# Patient Record
Sex: FEMALE | Employment: FULL TIME | URBAN - METROPOLITAN AREA
[De-identification: names, ages, dates, MRNs, and addresses within clinical notes are randomized per-mention and may not be internally consistent; named-entity substitution may affect disease eponyms.]

---

## 2018-04-30 ENCOUNTER — HOSPITAL ENCOUNTER (OUTPATIENT)
Dept: ONCOLOGY | Age: 53
Discharge: HOME | End: 2018-04-30

## 2018-04-30 ENCOUNTER — OFFICE VISIT (OUTPATIENT)
Dept: ONCOLOGY | Age: 53
End: 2018-04-30

## 2018-04-30 ENCOUNTER — HOSPITAL ENCOUNTER (OUTPATIENT)
Dept: LAB | Age: 53
Discharge: HOME | End: 2018-04-30
Payer: SELF-PAY

## 2018-04-30 VITALS
BODY MASS INDEX: 26.97 KG/M2 | WEIGHT: 171.8 LBS | TEMPERATURE: 98.2 F | SYSTOLIC BLOOD PRESSURE: 146 MMHG | HEART RATE: 73 BPM | DIASTOLIC BLOOD PRESSURE: 87 MMHG | HEIGHT: 67 IN

## 2018-04-30 DIAGNOSIS — D50.8 IRON DEFICIENCY ANEMIA SECONDARY TO INADEQUATE DIETARY IRON INTAKE: Primary | ICD-10-CM

## 2018-04-30 DIAGNOSIS — D50.8 IRON DEFICIENCY ANEMIA SECONDARY TO INADEQUATE DIETARY IRON INTAKE: ICD-10-CM

## 2018-04-30 LAB
ALBUMIN SERPL-MCNC: 3.5 G/DL (ref 3.4–5)
ALBUMIN/GLOB SERPL: 0.8 {RATIO} (ref 0.8–1.7)
ALP SERPL-CCNC: 87 U/L (ref 45–117)
ALT SERPL-CCNC: 16 U/L (ref 13–56)
ANION GAP SERPL CALC-SCNC: 12 MMOL/L (ref 3–18)
AST SERPL-CCNC: 15 U/L (ref 15–37)
BASO+EOS+MONOS # BLD AUTO: 0.5 K/UL (ref 0–2.3)
BASO+EOS+MONOS # BLD AUTO: 9 % (ref 0.1–17)
BILIRUB SERPL-MCNC: 0.3 MG/DL (ref 0.2–1)
BUN SERPL-MCNC: 11 MG/DL (ref 7–18)
BUN/CREAT SERPL: 15 (ref 12–20)
CALCIUM SERPL-MCNC: 9.1 MG/DL (ref 8.5–10.1)
CHLORIDE SERPL-SCNC: 103 MMOL/L (ref 100–108)
CO2 SERPL-SCNC: 22 MMOL/L (ref 21–32)
CREAT SERPL-MCNC: 0.74 MG/DL (ref 0.6–1.3)
DIFFERENTIAL METHOD BLD: ABNORMAL
ERYTHROCYTE [DISTWIDTH] IN BLOOD BY AUTOMATED COUNT: 17.1 % (ref 11.5–14.5)
FERRITIN SERPL-MCNC: 5 NG/ML (ref 8–388)
GLOBULIN SER CALC-MCNC: 4.2 G/DL (ref 2–4)
GLUCOSE SERPL-MCNC: 129 MG/DL (ref 74–99)
HCT VFR BLD AUTO: 27.4 % (ref 36–48)
HGB BLD-MCNC: 7.9 G/DL (ref 12–16)
IRON SATN MFR SERPL: 3 %
IRON SERPL-MCNC: 18 UG/DL (ref 50–175)
LYMPHOCYTES # BLD: 1.8 K/UL (ref 1.1–5.9)
LYMPHOCYTES NFR BLD: 28 % (ref 14–44)
MCH RBC QN AUTO: 19.6 PG (ref 25–35)
MCHC RBC AUTO-ENTMCNC: 28.8 G/DL (ref 31–37)
MCV RBC AUTO: 67.8 FL (ref 78–102)
NEUTS SEG # BLD: 4 K/UL (ref 1.8–9.5)
NEUTS SEG NFR BLD: 63 % (ref 40–70)
PLATELET # BLD AUTO: 488 K/UL (ref 140–440)
POTASSIUM SERPL-SCNC: 4 MMOL/L (ref 3.5–5.5)
PROT SERPL-MCNC: 7.7 G/DL (ref 6.4–8.2)
RBC # BLD AUTO: 4.04 M/UL (ref 4.1–5.1)
SODIUM SERPL-SCNC: 137 MMOL/L (ref 136–145)
TIBC SERPL-MCNC: 535 UG/DL (ref 250–450)
WBC # BLD AUTO: 6.3 K/UL (ref 4.5–13)

## 2018-04-30 PROCEDURE — 80053 COMPREHEN METABOLIC PANEL: CPT | Performed by: INTERNAL MEDICINE

## 2018-04-30 PROCEDURE — 36415 COLL VENOUS BLD VENIPUNCTURE: CPT | Performed by: INTERNAL MEDICINE

## 2018-04-30 PROCEDURE — 83540 ASSAY OF IRON: CPT | Performed by: INTERNAL MEDICINE

## 2018-04-30 PROCEDURE — 82728 ASSAY OF FERRITIN: CPT | Performed by: INTERNAL MEDICINE

## 2018-04-30 PROCEDURE — 84165 PROTEIN E-PHORESIS SERUM: CPT | Performed by: INTERNAL MEDICINE

## 2018-04-30 RX ORDER — CYCLOBENZAPRINE HCL 10 MG
TABLET ORAL
COMMUNITY
End: 2018-05-16

## 2018-04-30 RX ORDER — CLOPIDOGREL BISULFATE 75 MG/1
75 TABLET ORAL DAILY
COMMUNITY

## 2018-04-30 NOTE — PROGRESS NOTES
Hematology/Oncology Consultation Note    Name: Wayna Landau  Date: 2018  : 1965    PCP: Phong Houston MD       Ms. Horacio Browne  is a 52 y.o. -American woman who is referred for evaluation of anemia. Subjective:   Chief complaint: Low blood count    History of present illness:  Ms. Horacio Browne is a 47-year-old woman who had lab test done on 3/29/2018 which reveal a low hemoglobin level of 8.2 g/dL with hematocrit of 28.9%. A WBC count was normal at 6.6 and her platelets were slightly elevated at 491,000. The patient denies having any blood in her urine or stool. She states that she has not had problems in the past with anemia. Currently she is experiencing occasional fatigue and some weakness. She has not taken any over-the-counter iron supplements. She is here today for complete assessment and recommendations for management. Past Medical History:   Diagnosis Date    Anemia     Constipation     Diabetes ([facility]     Hypertension     Poor appetite     Stomach pain     [facility]        No Known Allergies    No past surgical history on file. Social History     Social History    Marital status:      Spouse name: N/A    Number of children: N/A    Years of education: N/A     Occupational History    Not on file.      Social History Main Topics    Smoking status: Current Every Day Smoker     Packs/day: 0.25    Smokeless tobacco: Never Used    Alcohol use Yes      Comment: occassionally    Drug use: No    Sexual activity: Not on file     Other Topics Concern    Not on file     Social History Narrative    No narrative on file       Family History   Problem Relation Age of Onset    Cancer Mother     Hypertension Mother     Diabetes Mother     Stroke Mother     Cancer Sister      cervical    Hypertension Sister     Cancer Brother      pancreatic    Hypertension Brother     Diabetes Brother     Cancer Sister      colon    Hypertension Sister        Current Outpatient Prescriptions   Medication Sig Dispense Refill    clopidogrel (PLAVIX) 75 mg tab Take  by mouth.  cyclobenzaprine (FLEXERIL) 10 mg tablet Take  by mouth three (3) times daily as needed for Muscle Spasm(s).  Diclofenac Potassium 50 mg pwpk Take  by mouth three (3) times daily. Review of Systems    General ROS:The patient has complaints of mild fatigue and weakness. Psychological ROS: patient denies having any psychological symptoms such as hallucinations, depression or anxiety. Ophthalmic ROS:the patient denies having any visual impairment or eye discomfort. ENT ROS: there are no abnormalities reported. Allergy and Immunology ROS:the patient denies having any seasonal allergies or allergies to medications other than those already outlined above. Hematological and Lymphatic ROS: the patient denies having any bruising, bleeding or lymphadenopathy. Endocrine ROS: the patient denies having any heat or cold intolerance. There is no history of diabetes or thyroid disorders. Breast ROS: the patient denies having any history of breast mass, nipple discharge, or lumps. Respiratory ROS:the patient denies having any cough, shortness of breath, or dyspnea on exertion. Cardiovascular ROS: there are no complaints of chest pain, palpitations, chest pounding, or dyspnea on exertion. Gastrointestinal ROS: the patient denies having nausea, emesis, diarrhea, constipation, or blood in the stool. Genito-Urinary ROS: the patient denies having urinary urgency, frequency, or dysuria. Musculoskeletal ROS: with the exception of mild arthralgias the patient has no other musculoskeletal complaints. Neurological ROS: the patient denies having any numbness, tingling, or neurologic deficits. Dermatological ROS:patient denies having any unexplained rash, skin ulcerations, or hives.       Objective:     Visit Vitals    /87    Pulse 73    Temp 98.2 °F (36.8 °C) (Oral)    Ht 5' 7\" (1.702 m)    Wt 77.9 kg (171 lb 12.8 oz)    BMI 26.91 kg/m2        Physical Exam:   Gen. Appearance: the patient is in no acute distress. Skin: There is no evidence of bruise or rash. HEENT: The head is normocephalic and atraumatic. The conjunctiva and sclera are clear. Pupils are equal, round, reactive to light, and accommodation. The extraocular movements are intact. ENT reveals no oral mucosal lesions or ulcerations. Neck: Supple without lymphadenopathy or thyromegaly. Lungs: Clear to auscultation and percussion; there are no wheezes or rhonchi. Heart: Regular rate and rhythm; there are no murmurs, gallops, or rubs. Abdomen: Bowel sounds are present and normal.  There is no guarding, tenderness, or hepatosplenomegaly. Extremities: There is no clubbing, cyanosis, or edema. Neurologic: There are no focal neurologic deficits. Lymphatics: There is no palpable peripheral lymphadenopathy. Lab data: The CBC from 3/29/2018 shows WBC count of 6.6, hemoglobin 8.2 g/dL, hematocrit 28.9%, and platelet count was 163,704. Assessment:   Chronic anemia: I suspect we are dealing with iron deficiency anemia however plasma cell dyscrasia is also a diagnostic consideration as well. Thrombocytosis: I suspect that this is reactive thrombocytosis due to her underlying anemia. Plan:   Chronic anemia: At this time I will evaluate for the possibility that we are dealing with iron deficiency anemia versus a plasma cell dyscrasia. A comprehensive metabolic panel, iron profile, and ferritin level will be obtained. Additionally, an SPEP will also be requested. Thrombocytosis: Pending the results of the tests for iron deficiency the patient may need to undergo a bone marrow biopsy if there is no response to therapy for iron deficiency.     Follow-up in 2 weeks to review lab data    Orders Placed This Encounter    COMPLETE CBC & AUTO DIFF WBC    METABOLIC PANEL, COMPREHENSIVE     Standing Status:   Future     Standing Expiration Date: 5/1/2019    IRON PROFILE     Standing Status:   Future     Standing Expiration Date:   5/1/2019    SPEP     Standing Status:   Future     Standing Expiration Date:   5/1/2019    FERRITIN     Standing Status:   Future     Standing Expiration Date:   5/1/2019    InHouse CBC ([employer])     Standing Status:   Future     Number of Occurrences:   1     Standing Expiration Date:   5/7/2018    clopidogrel (PLAVIX) 75 mg tab     Sig: Take  by mouth.  cyclobenzaprine (FLEXERIL) 10 mg tablet     Sig: Take  by mouth three (3) times daily as needed for Muscle Spasm(s).  Diclofenac Potassium 50 mg pwpk     Sig: Take  by mouth three (3) times daily. Ирина Reeves MD  4/30/2018      Please note: This document has been produced using voice recognition software. Unrecognized errors in transcription may be present.

## 2018-04-30 NOTE — MR AVS SNAPSHOT
[address] [address] [address] [facility] 
[phone] Patient: Freedom Mishra MRN: [id number] QCQ:3/2/9029 Visit Information Date & Time Provider Department Dept. Phone Encounter #  
 4/30/2018 10:45 AM Audrey Mullins MD [facility] Medical Oncology [phone] [id number] Follow-up Instructions Return in about 2 weeks (around 5/14/2018). Your Appointments 5/1/2018 11:00 AM  
New Patient with MD LUIS ENRIQUE [facility] ([facility]) Appt Note: Ref from Dr Virgilio Jensen [facility] [id number] [address] [place] [address] 5/14/2018  9:30 AM  
Office Visit with Audrey Mullins MD  
[facility]  Oncology [facility]) Appt Note: 2 week results [address], [address] [place] [address] [address] [facility] Upcoming Health Maintenance Date Due Hepatitis C Screening 1965 DTaP/Tdap/Td series (1 - Tdap) 3/1/1984 PAP AKA CERVICAL CYTOLOGY 3/1/1984 BREAST CANCER SCRN MAMMOGRAM 3/1/2013 FOBT Q 1 YEAR AGE 50-75 3/1/2013 Influenza Age 5 to Adult 8/1/2018 Allergies as of 4/30/2018  Review Complete On: 4/30/2018 By: Audrey Mullins MD  
 No Known Allergies Current Immunizations  Never Reviewed No immunizations on file. Not reviewed this visit You Were Diagnosed With   
  
 Codes Comments Iron deficiency anemia secondary to inadequate dietary iron intake    -  Primary ICD-10-CM: D50.8 ICD-9-CM: 280.1 Vitals BP Pulse Temp Height(growth percentile) Weight(growth percentile) BMI  
 146/87 73 98.2 °F (36.8 °C) (Oral) 5' 7\" (1.702 m) 171 lb 12.8 oz (77.9 kg) 26.91 kg/m2 Smoking Status Current Every Day Smoker BMI and BSA Data Body Mass Index Body Surface Area  
 26.91 kg/m 2 1.92 m 2 Your Updated Medication List  
  
   
This list is accurate as of 4/30/18 12:14 PM.  Always use your most recent med list.  
  
  
  
  
 cyclobenzaprine 10 mg tablet Commonly known as:  FLEXERIL Take  by mouth three (3) times daily as needed for Muscle Spasm(s). Diclofenac Potassium 50 mg Pwpk Take  by mouth three (3) times daily. PLAVIX 75 mg Tab Generic drug:  clopidogrel Take  by mouth. We Performed the Following COMPLETE CBC & AUTO DIFF WBC [53856 CPT(R)] Follow-up Instructions Return in about 2 weeks (around 5/14/2018). To-Do List   
 04/30/2018 Lab:  CBC WITH 3 PART DIFF Patient Instructions Iron Deficiency Anemia: Care Instructions Your Care Instructions Anemia means that you do not have enough red blood cells. Red blood cells carry oxygen around your body. When you have anemia, it can make you pale, weak, and tired. Many things can cause anemia. The most common cause is loss of blood. This can happen if you have heavy menstrual periods. It can also happen if you have bleeding in your stomach or bowel. You can also get anemia if you don't have enough iron in your diet or if it's hard for your body to absorb iron. In some cases, pregnancy causes anemia. That's because a pregnant woman needs more iron. Your doctor may do more tests to find the cause of your anemia. If a disease or other health problem is causing it, your doctor will treat that problem. It's important to follow up with your doctor to make sure that your iron level returns to normal. 
Follow-up care is a key part of your treatment and safety. Be sure to make and go to all appointments, and call your doctor if you are having problems. It's also a good idea to know your test results and keep a list of the medicines you take. How can you care for yourself at home? · If your doctor recommended iron pills, take them as directed. ¨ Try to take the pills on an empty stomach. You can do this about 1 hour before or 2 hours after meals. But you may need to take iron with food to avoid an upset stomach. ¨ Do not take antacids or drink milk or anything with caffeine within 2 hours of when you take your iron. They can keep your body from absorbing the iron well. ¨ Vitamin C helps your body absorb iron. You may want to take iron pills with a glass of orange juice or some other food high in vitamin C. 
¨ Iron pills may cause stomach problems. These include heartburn, nausea, diarrhea, constipation, and cramps. It can help to drink plenty of fluids and include fruits, vegetables, and fiber in your diet. ¨ It's normal for iron pills to make your stool a greenish or grayish black. But internal bleeding can also cause dark stool. So it's important to tell your doctor about any color changes. ¨ Call your doctor if you think you are having a problem with your iron pills. Even after you start to feel better, it will take several months for your body to build up its supply of iron. ¨ If you miss a pill, don't take a double dose. ¨ Keep iron pills out of the reach of small children. Too much iron can be very dangerous. · Eat foods with a lot of iron. These include red meat, shellfish, poultry, and eggs. They also include beans, raisins, whole-grain bread, and leafy green vegetables. · Steam your vegetables. This is the best way to prepare them if you want to get as much iron as possible. · Be safe with medicines. Do not take nonsteroidal anti-inflammatory pain relievers unless your doctor tells you to. These include aspirin, naproxen (Aleve), and ibuprofen (Advil, Motrin). · Liquid iron can stain your teeth. But you can mix it with water or juice and drink it with a straw. Then it won't get on your teeth. When should you call for help? Call 911 anytime you think you may need emergency care. For example, call if: 
? · You passed out (lost consciousness). ?Call your doctor now or seek immediate medical care if: 
? · You are short of breath. ? · You are dizzy or light-headed, or you feel like you may faint. ? · You have new or worse bleeding. ? Watch closely for changes in your health, and be sure to contact your doctor if: 
? · You feel weaker or more tired than usual.  
? · You do not get better as expected. Where can you learn more? Go to http://lucien-morgan.info/. Enter I313 in the search box to learn more about \"Iron Deficiency Anemia: Care Instructions. \" Current as of: October 13, 2016 Content Version: 11.4 © 0494-9599 [employer]. Care instructions adapted under license by [employer] (which disclaims liability or warranty for this information). If you have questions about a medical condition or this instruction, always ask your healthcare professional. [address] any warranty or liability for your use of this information. Introducing \[facility] [place] [facility]\"" & HEALTH SERVICES! Bruno Gilliam introduces [email or web address] patient portal. Now you can access parts of your medical record, email your doctor's office, and request medication refills online. 1. In your internet browser, go to [email or web address] 2. Click on the First Time User? Click Here link in the Sign In box. You will see the New Member Sign Up page. 3. Enter your [email or web address].Me Access Code exactly as it appears below. You will not need to use this code after youve completed the sign-up process. If you do not sign up before the expiration date, you must request a new code. · [email or web address] Access Code: [email or web address] Expires: 7/29/2018 12:14 PM 
 
4. Enter the last four digits of your Social Security Number (xxxx) and Date of Birth (mm/dd/yyyy) as indicated and click Submit.  You will be taken to the next sign-up page. 5. Create a IdeaSquares ID. This will be your IdeaSqu[employer] login ID and cannot be changed, so think of one that is secure and easy to remember. 6. Create a Ide[employer] password. You can change your password at any time. 7. Enter your Password Reset Question and Answer. This can be used at a later time if you forget your password. 8. Enter your e-mail address. You will receive e-mail notification when new information is available in [address]. 9. Click Sign Up. You can now view and download portions of your medical record. 10. Click the Download Summary menu link to download a portable copy of your medical information. If you have questions, please visit the Frequently Asked Questions section of the [employer] website. Remember, Idea[employer] is NOT to be used for urgent needs. For medical emergencies, dial 911. Now available from your iPhone and Android! Please provide this summary of care documentation to your next provider. If you have any questions after today's visit, please call [phone].

## 2018-04-30 NOTE — PATIENT INSTRUCTIONS
Iron Deficiency Anemia: Care Instructions  Your Care Instructions    Anemia means that you do not have enough red blood cells. Red blood cells carry oxygen around your body. When you have anemia, it can make you pale, weak, and tired. Many things can cause anemia. The most common cause is loss of blood. This can happen if you have heavy menstrual periods. It can also happen if you have bleeding in your stomach or bowel. You can also get anemia if you don't have enough iron in your diet or if it's hard for your body to absorb iron. In some cases, pregnancy causes anemia. That's because a pregnant woman needs more iron. Your doctor may do more tests to find the cause of your anemia. If a disease or other health problem is causing it, your doctor will treat that problem. It's important to follow up with your doctor to make sure that your iron level returns to normal.  Follow-up care is a key part of your treatment and safety. Be sure to make and go to all appointments, and call your doctor if you are having problems. It's also a good idea to know your test results and keep a list of the medicines you take. How can you care for yourself at home? · If your doctor recommended iron pills, take them as directed. ¨ Try to take the pills on an empty stomach. You can do this about 1 hour before or 2 hours after meals. But you may need to take iron with food to avoid an upset stomach. ¨ Do not take antacids or drink milk or anything with caffeine within 2 hours of when you take your iron. They can keep your body from absorbing the iron well. ¨ Vitamin C helps your body absorb iron. You may want to take iron pills with a glass of orange juice or some other food high in vitamin C.  ¨ Iron pills may cause stomach problems. These include heartburn, nausea, diarrhea, constipation, and cramps. It can help to drink plenty of fluids and include fruits, vegetables, and fiber in your diet.   ¨ It's normal for iron pills to make your stool a greenish or grayish black. But internal bleeding can also cause dark stool. So it's important to tell your doctor about any color changes. ¨ Call your doctor if you think you are having a problem with your iron pills. Even after you start to feel better, it will take several months for your body to build up its supply of iron. ¨ If you miss a pill, don't take a double dose. ¨ Keep iron pills out of the reach of small children. Too much iron can be very dangerous. · Eat foods with a lot of iron. These include red meat, shellfish, poultry, and eggs. They also include beans, raisins, whole-grain bread, and leafy green vegetables. · Steam your vegetables. This is the best way to prepare them if you want to get as much iron as possible. · Be safe with medicines. Do not take nonsteroidal anti-inflammatory pain relievers unless your doctor tells you to. These include aspirin, naproxen (Aleve), and ibuprofen (Advil, Motrin). · Liquid iron can stain your teeth. But you can mix it with water or juice and drink it with a straw. Then it won't get on your teeth. When should you call for help? Call 911 anytime you think you may need emergency care. For example, call if:  ? · You passed out (lost consciousness). ?Call your doctor now or seek immediate medical care if:  ? · You are short of breath. ? · You are dizzy or light-headed, or you feel like you may faint. ? · You have new or worse bleeding. ? Watch closely for changes in your health, and be sure to contact your doctor if:  ? · You feel weaker or more tired than usual.   ? · You do not get better as expected. Where can you learn more? Go to http://lucien-morgan.info/. Enter G805 in the search box to learn more about \"Iron Deficiency Anemia: Care Instructions. \"  Current as of: October 13, 2016  Content Version: 11.4  © 7231-5144 [employer].  Care instructions adapted under license by [employer] (which disclaims liability or warranty for this information). If you have questions about a medical condition or this instruction, always ask your healthcare professional. [address] any warranty or liability for your use of this information.

## 2018-05-01 ENCOUNTER — OFFICE VISIT (OUTPATIENT)
Dept: SURGERY | Age: 53
End: 2018-05-01

## 2018-05-01 VITALS
HEIGHT: 67 IN | RESPIRATION RATE: 17 BRPM | WEIGHT: 169 LBS | TEMPERATURE: 98.7 F | SYSTOLIC BLOOD PRESSURE: 128 MMHG | DIASTOLIC BLOOD PRESSURE: 78 MMHG | BODY MASS INDEX: 26.53 KG/M2 | OXYGEN SATURATION: 99 % | HEART RATE: 74 BPM

## 2018-05-01 DIAGNOSIS — K59.00 CONSTIPATION, UNSPECIFIED CONSTIPATION TYPE: ICD-10-CM

## 2018-05-01 DIAGNOSIS — R10.84 GENERALIZED ABDOMINAL PAIN: Primary | ICD-10-CM

## 2018-05-01 LAB
ALBUMIN SERPL ELPH-MCNC: 3.3 G/DL (ref 2.9–4.4)
ALBUMIN/GLOB SERPL: 0.9 {RATIO} (ref 0.7–1.7)
ALPHA1 GLOB SERPL ELPH-MCNC: 0.3 G/DL (ref 0–0.4)
ALPHA2 GLOB SERPL ELPH-MCNC: 1 G/DL (ref 0.4–1)
B-GLOBULIN SERPL ELPH-MCNC: 1.4 G/DL (ref 0.7–1.3)
GAMMA GLOB SERPL ELPH-MCNC: 1.1 G/DL (ref 0.4–1.8)
GLOBULIN SER CALC-MCNC: 3.7 G/DL (ref 2.2–3.9)
M PROTEIN SERPL ELPH-MCNC: ABNORMAL G/DL
PROT SERPL-MCNC: 7 G/DL (ref 6–8.5)

## 2018-05-01 NOTE — PROGRESS NOTES
[facility]  General Surgery    Subjective:      HPI: Patient is a very pleasant 78-year-old female with a past medical history which is remarkable for hypertension, diabetes mellitus, cerebrovascular accident without deficit, anemia and anorexia. She was referred by Dr. Angel Rich for evaluation and management of abdominal pain. The patient states that she has been experiencing abnormal bowel movements and abdominal discomfort for 1-2 months. Prior to this she had no issues. Her stools she describes as little balls. She has to strain to have bowel movements. She denies any changes in medications or diet. She denies any blood in the stool. When I explored her diet she states that she drinks about a gallon of water per day or more. She only drinks a cup of coffee every 1-3 days. She drinks tea or soda and alcohol only occasionally. For breakfast she usually has bulge and eggs and sausage. For lunch she usually has a sandwich from home with chips and sometimes a fruit cup or pudding. For dinner she has a meat sometimes fried chicken cream potatoes and occasionally string beans or [place] sprouts. She only has one serving to 2 servings of fruits and vegetables per day on any given day. I discussed with the patient that the recommendation for the minimum servings of fruits and vegetables as 5 per day. I instructed her that if she had a piece of fruit at breakfast a piece of fruit and a salad or vegetable at lunch and then 2 vegetables at dinner she will meet this 5 servings. We also discussed fiber supplementation. She states that she should be able to increase her fresh fruit and vegetable intake without a problem. She states that she has tried warm water and Epsom salt which produced a good bowel movement on several occasions. She also has use apple juice to have a good bowel movement.     Patient Active Problem List    Diagnosis Date Noted    Iron deficiency anemia secondary to inadequate dietary iron intake 04/30/2018     Past Medical History:   Diagnosis Date    Anemia     Constipation     [address] [address]     Hypertension     Poor appetite     Stomach pain     Stroke ([facility] [address]     no effects      Past Surgical History:   Procedure Laterality Date    HX APPENDECTOMY        Family History   Problem Relation Age of Onset    Cancer Mother     Hypertension Mother     Diabetes Mother     Stroke Mother     Cancer Sister      cervical    Hypertension Sister    [facility] Cancer Brother      pancreatic    Hypertension Brother     Diabetes Brother     Cancer Sister      colon    Hypertension Sister       Social History   Substance Use Topics    Smoking status: Current Every Day Smoker     Packs/day: 0.25    Smokeless tobacco: Never Used    Alcohol use Yes      Comment: occassionally      No Known Allergies    Prior to Admission medications    Medication Sig Start Date End Date Taking? Authorizing Provider   clopidogrel (PLAVIX) 75 mg tab Take  by mouth. Yes Historical Provider   cyclobenzaprine (FLEXERIL) 10 mg tablet Take  by mouth three (3) times daily as needed for Muscle Spasm(s). Yes Historical Provider   Diclofenac Potassium 50 mg pwpk Take  by mouth three (3) times daily. Yes Historical Provider       Review of Systems:    14 systems were reviewed. The results are as above in the HPI and otherwise negative. Objective:     Vitals:    05/01/18 1112   BP: 128/78   Pulse: 74   Resp: 17   Temp: 98.7 °F (37.1 °C)   TempSrc: Oral   SpO2: 99%   Weight: 76.7 kg (169 lb)   Height: 5' 7\" (1.702 m)       Physical Exam:  GENERAL: alert, cooperative, no distress, appears stated age,   EYE: conjunctivae/corneas clear. PERRL, EOM's intact.   THROAT & NECK: normal and no erythema or exudates noted. ,    LYMPHATIC: Cervical, supraclavicular, and axillary nodes normal. ,   LUNG: clear to auscultation bilaterally,   HEART: regular rate and rhythm, S1, S2 normal, no murmur, click, rub or gallop,   ABDOMEN: soft, non-tender. Bowel sounds normal. No masses,  no organomegaly,   Rectal: No external lesions or erythema. Good sphincter tone. No masses within the anal rectal canal.  Soft brown stool without any gross blood. EXTREMITIES:  extremities normal, atraumatic, no cyanosis or edema,   SKIN: Normal.,   NEUROLOGIC: AOx3. Cranial nerves 2-12 and sensation grossly intact. ,     Data Review: States she had a CAT scan in the [facility] system recently. I will review this CAT scan report was her care everywhere tab is installed. Ms. Dedra Kirk has a reminder for a \"due or due soon\" health maintenance. I have asked that she contact her primary care provider for follow-up on this health maintenance. Impression:     · Patient with abdominal pain which is most likely secondary to constipation from low fiber intake.     Plan:     · Increase fruits and vegetables in the diet  · Referral to colorectal surgery-Dr. Denzel Smith for colonoscopy  · Follow-up in 1 month    Signed By: Deborah Mcghee MD     May 1, 2018

## 2018-05-01 NOTE — PROGRESS NOTES
Colon Screen    Patient was contacted by phone for the documentation reflected in this encounter    Patient: Maurice Mora MRN: [id number]  SSN: [id number]    YOB: 1965  Age: 52 y.o. Sex: female        Subjective:   Maurice Mora was referred by Dr. Stevo Connors. PCP is Sandeep Majano MD.  Patient referred for colonoscopy for   Screening colonoscopy. Patient denies abdominal pain,rectal pain or bleeding. Abdominal surgeries as described below, specifically appendectomy  Family history as described below, specifically none. Pt never had cn. Is patient currently on Oxygen: no  Is patient taking blood thinners, fluid pills,or medication for diabetes? Yes plavix and a nsaid       Does the patient have a history of any condition listed below? Cardiac, pulmonary or hepatic disease? no  Severe coronary artery disease or aortic stenosis? no  Throat cancer? no  Heart transplant? no  Endocarditis? no  Heart valve replacement? no  Congestive heart failure? no        No Known Allergies    Past Medical History:   Diagnosis Date    Anemia     Constipation     Diabetes (HCC)     Hypertension     Poor appetite     Stomach pain     Stroke ([address]     no effects     Past Surgical History:   Procedure Laterality Date    HX APPENDECTOMY        Family History   Problem Relation Age of Onset    Cancer Mother     Hypertension Mother     Diabetes Mother     Stroke Mother     Cancer Sister      cervical    Hypertension Sister     Cancer Brother      pancreatic    Hypertension Brother     Diabetes Brother     Cancer Sister      colon    Hypertension Sister      Social History   Substance Use Topics    Smoking status: Current Every Day Smoker     Packs/day: 0.25    Smokeless tobacco: Never Used    Alcohol use Yes      Comment: occassionally      Prior to Admission medications    Medication Sig Start Date End Date Taking? Authorizing Provider   clopidogrel (PLAVIX) 75 mg tab Take  by mouth. Yes Historical Provider   cyclobenzaprine (FLEXERIL) 10 mg tablet Take  by mouth three (3) times daily as needed for Muscle Spasm(s). Yes Historical Provider   Diclofenac Potassium 50 mg pwpk Take  by mouth three (3) times daily. Yes Historical Provider          Review of Systems   Constitutional: Positive for weight loss. Negative for chills, diaphoresis, fever and malaise/fatigue. HENT: Negative. Eyes: Negative. Respiratory: Negative. Cardiovascular: Negative. Gastrointestinal: Positive for blood in stool. Negative for abdominal pain, constipation, diarrhea, heartburn, melena, nausea and vomiting. Genitourinary: Negative. Musculoskeletal: Negative. Negative for myalgias and neck pain. Skin: Negative. Neurological: Negative. Negative for weakness. Endo/Heme/Allergies: Negative. Psychiatric/Behavioral: Negative.             Risks colonoscopy described- colon injury, missed lesion, anesthesia problems, bleeding       Ashleigh Nieves RN  May 1, 2018  3:25 PM

## 2018-05-14 ENCOUNTER — OFFICE VISIT (OUTPATIENT)
Dept: ONCOLOGY | Age: 53
End: 2018-05-14

## 2018-05-14 VITALS
DIASTOLIC BLOOD PRESSURE: 79 MMHG | TEMPERATURE: 98.1 F | BODY MASS INDEX: 26 KG/M2 | SYSTOLIC BLOOD PRESSURE: 127 MMHG | WEIGHT: 166 LBS | HEART RATE: 96 BPM

## 2018-05-14 DIAGNOSIS — D50.8 IRON DEFICIENCY ANEMIA SECONDARY TO INADEQUATE DIETARY IRON INTAKE: Primary | ICD-10-CM

## 2018-05-14 NOTE — PROGRESS NOTES
Hematology/Oncology  Progress Note    Name: Syeda Baron  Date: 2018  : 1965    Abhishek Augustine MD     Ms. You Ashby is a 52y.o. year old female who was seen for evaluation of iron deficiency       Subjective:   Ms You Ashby is in the office today for result review. lab test done on 3/29/2018 which reveal a low hemoglobin level of 8.2 g/dL with hematocrit of 28.9%. A WBC count was normal at 6.6 and her platelets were slightly elevated at 491,000. CBC on 2018 shows a WBC of 6.9, Hemoglobin of 7.9 g/dl and Hematocrit of 27.4%. Platelet of 435,993. Comprehensive metabolic panel reveals normal sodium of 137, Potassium of 4.0, chloride of 103, Glucose of 129, creatinine of 0.74 and BUN is 11. His total iron is 18 and iron saturation is 3% with a low Ferritin of 5 ng/ml. The patient denies having any blood in her urine or stool. Her SPEP shows that his M-Wilfrid is not observable. I have inform the patient that she seems to have iron deficiency anemia. She will be schedule to have an iron infusion in the form of injectafer. I have also inform the patient that no additional test is warranted at this time. She will be in the office for follow up in 6 weeks after the iron infusion and to see how well she has responded to the iron treatment. Past medical history, family history, and social history: these were reviewed and remains unchanged. Past Medical History:   Diagnosis Date    Anemia     Constipation     Diabetes ([address]     Hypertension     Poor appetite     Stomach pain     Stroke [address] [address]     no effects     Past Surgical History:   Procedure Laterality Date    HX APPENDECTOMY       Social History     Social History    Marital status:      Spouse name: N/A    Number of children: N/A    Years of education: N/A     Occupational History    Not on file.      Social History Main Topics    Smoking status: Current Every Day Smoker     Packs/day: 0.25    Smokeless tobacco: Never Used   [facility] Alcohol use Yes      Comment: occassionally    Drug use: No    Sexual activity: Not on file     Other Topics Concern    Not on file     Social History Narrative     Family History   Problem Relation Age of Onset    Cancer Mother     Hypertension Mother     Diabetes Mother     Stroke Mother     Cancer Sister      cervical    Hypertension Sister     Cancer Brother      pancreatic    Hypertension Brother     Diabetes Brother     Cancer Sister      colon    Hypertension Sister      Current Outpatient Prescriptions   Medication Sig Dispense Refill    clopidogrel (PLAVIX) 75 mg tab Take  by mouth.  cyclobenzaprine (FLEXERIL) 10 mg tablet Take  by mouth three (3) times daily as needed for Muscle Spasm(s).  Diclofenac Potassium 50 mg pwpk Take  by mouth three (3) times daily. Review of Systems  Constitutional: The patient has no acute distress or discomfort. HEENT: The patient denies recent head trauma, eye pain, blurred vision,  hearing deficit, oropharyngeal mucosal pain or lesions, and the patient denies throat pain or discomfort. Lymphatics: The patient denies palpable peripheral lymphadenopathy. Hematologic: The patient denies having bruising, bleeding, or progressive fatigue. Respiratory: Patient denies having shortness of breath, cough, sputum production, fever, or dyspnea on exertion. Cardiovascular: The patient denies having leg pain, leg swelling, heart palpitations, chest permit, chest pain, or lightheadedness. The patient denies having dyspnea on exertion. Gastrointestinal: The patient denies having nausea, emesis, or diarrhea. The patient denies having any hematemesis or blood in the stool. Genitourinary: Patient denies having urinary urgency, frequency, or dysuria. The patient denies having blood in the urine. Psychological: The patient denies having symptoms of nervousness, anxiety, depression, or thoughts of harming self.   Skin: Patient denies having skin rashes, skin, ulcerations, or unexplained itching or pruritus. Musculoskeletal: The patient denies having pain in the joints or bones. Objective:     Visit Vitals    /79    Pulse 96    Temp 98.1 °F (36.7 °C) (Oral)    Wt 75.3 kg (166 lb)    BMI 26 kg/m2     ECOG PS=0  Physical Exam:   Gen. Appearance: The patient is in no acute distress. Skin: There is no bruise or rash. HEENT: The exam is unremarkable. Neck: Supple without lymphadenopathy or thyromegaly. Lungs: Clear to auscultation and percussion; there are no wheezes or rhonchi. Heart: Regular rate and rhythm; there are no murmurs, gallops, or rubs. Abdomen: Bowel sounds are present and normal.  There is no guarding, tenderness, or hepatosplenomegaly. Extremities: There is no clubbing, cyanosis, or edema. Neurologic: There are no focal neurologic deficits. Lymphatics: There is no palpable peripheral lymphadenopathy. Musculoskeletal: The patient has full range of motion at all joints. There is no evidence of joint deformity or effusions. There is no focal joint tenderness. Psychological/psychiatric: There is no clinical evidence of anxiety, depression, or melancholy. Lab data:      Results for orders placed or performed during the hospital encounter of 04/30/18   CBC WITH 3 PART DIFF     Status: Abnormal   Result Value Ref Range Status    WBC 6.3 4.5 - 13.0 K/uL Final    RBC 4.04 (L) 4.10 - 5.10 M/uL Final    HGB 7.9 (L) 12.0 - 16 g/dL Final    HCT 27.4 (L) 36 - 48 % Final    MCV 67.8 (L) 78 - 102 FL Final    MCH 19.6 (L) 25.0 - 35.0 PG Final    MCHC 28.8 (L) 31 - 37 g/dL Final    RDW 17.1 (H) 11.5 - 14.5 % Final    PLATELET 882 (H) 938 - 440 K/uL Final    NEUTROPHILS 63 40 - 70 % Final    MIXED CELLS 9 0.1 - 17 % Final    LYMPHOCYTES 28 14 - 44 % Final    ABS. NEUTROPHILS 4.0 1.8 - 9.5 K/UL Final    ABS. MIXED CELLS 0.5 0.0 - 2.3 K/uL Final    ABS.  LYMPHOCYTES 1.8 1.1 - 5.9 K/UL Final     Comment: Test performed [facility]. Results Reviewed by Medical Director. DF AUTOMATED   Final           Assessment:     1. Iron deficiency anemia secondary to inadequate dietary iron intake          Plan:   Anemia: pt will be schedule for iron infusion in the form injectafar. I have explained to patient that her CBC done 4/30/2018 shows his WBC is normal at f 6.9, Hemoglobin of 7.9 g/dl and Hematocrit of 27.4%. Platelet of 856,526. Comprehensive metabolic panel reveals normal sodium of 137, Potassium of 4.0, chloride of 103, Glucose of 129, creatinine of 0.74 and BUN is 11. His total iron is 18 and iron saturation is 3% with a low Ferritin of 5 ng/ml. The patient denies having any blood in her urine or stool. Her SPEP shows that his M-Wilfrid is not observable. I have recommended to patient that she ill need the intervenous iron infusion and she be see in office in 6 weeks to see how she is responding to the treatment. Pt verbalize understanding of plan and has verbally consented to getting the iron infusion. No orders of the defined types were placed in this encounter. Tulio Gomes NP  5/14/2018     I have assessed the patient independently and  agree with the full assessment as outlined. Joaquina James MD, FACP      Please note: This document has been produced using voice recognition software. Unrecognized errors in transcription may be present.

## 2018-05-14 NOTE — PATIENT INSTRUCTIONS
Iron Deficiency Anemia: Care Instructions  Your Care Instructions    Anemia means that you do not have enough red blood cells. Red blood cells carry oxygen around your body. When you have anemia, it can make you pale, weak, and tired. Many things can cause anemia. The most common cause is loss of blood. This can happen if you have heavy menstrual periods. It can also happen if you have bleeding in your stomach or bowel. You can also get anemia if you don't have enough iron in your diet or if it's hard for your body to absorb iron. In some cases, pregnancy causes anemia. That's because a pregnant woman needs more iron. Your doctor may do more tests to find the cause of your anemia. If a disease or other health problem is causing it, your doctor will treat that problem. It's important to follow up with your doctor to make sure that your iron level returns to normal.  Follow-up care is a key part of your treatment and safety. Be sure to make and go to all appointments, and call your doctor if you are having problems. It's also a good idea to know your test results and keep a list of the medicines you take. How can you care for yourself at home? · If your doctor recommended iron pills, take them as directed. ¨ Try to take the pills on an empty stomach. You can do this about 1 hour before or 2 hours after meals. But you may need to take iron with food to avoid an upset stomach. ¨ Do not take antacids or drink milk or anything with caffeine within 2 hours of when you take your iron. They can keep your body from absorbing the iron well. ¨ Vitamin C helps your body absorb iron. You may want to take iron pills with a glass of orange juice or some other food high in vitamin C.  ¨ Iron pills may cause stomach problems. These include heartburn, nausea, diarrhea, constipation, and cramps. It can help to drink plenty of fluids and include fruits, vegetables, and fiber in your diet.   ¨ It's normal for iron pills to make your stool a greenish or grayish black. But internal bleeding can also cause dark stool. So it's important to tell your doctor about any color changes. ¨ Call your doctor if you think you are having a problem with your iron pills. Even after you start to feel better, it will take several months for your body to build up its supply of iron. ¨ If you miss a pill, don't take a double dose. ¨ Keep iron pills out of the reach of small children. Too much iron can be very dangerous. · Eat foods with a lot of iron. These include red meat, shellfish, poultry, and eggs. They also include beans, raisins, whole-grain bread, and leafy green vegetables. · Steam your vegetables. This is the best way to prepare them if you want to get as much iron as possible. · Be safe with medicines. Do not take nonsteroidal anti-inflammatory pain relievers unless your doctor tells you to. These include aspirin, naproxen (Aleve), and ibuprofen (Advil, Motrin). · Liquid iron can stain your teeth. But you can mix it with water or juice and drink it with a straw. Then it won't get on your teeth. When should you call for help? Call 911 anytime you think you may need emergency care. For example, call if:  ? · You passed out (lost consciousness). ?Call your doctor now or seek immediate medical care if:  ? · You are short of breath. ? · You are dizzy or light-headed, or you feel like you may faint. ? · You have new or worse bleeding. ? Watch closely for changes in your health, and be sure to contact your doctor if:  ? · You feel weaker or more tired than usual.   ? · You do not get better as expected. Where can you learn more? Go to http://lucien-morgan.info/. Enter K138 in the search box to learn more about \"Iron Deficiency Anemia: Care Instructions. \"  Current as of: October 13, 2016  Content Version: 11.4  © 5136-9723 Healthwise, [employer].  Care instructions adapted under license by [employer] (which disclaims liability or warranty for this information). If you have questions about a medical condition or this instruction, always ask your healthcare professional. [address] any warranty or liability for your use of this information. Iron-Rich Diet: Care Instructions  Your Care Instructions    Your body needs iron to make hemoglobin. Hemoglobin is a substance in red blood cells that carries oxygen from the lungs to cells all through your body. If you do not get enough iron, your body makes fewer and smaller red blood cells. As a result, your body's cells may not get enough oxygen. Adult men need 8 milligrams of iron a day; adult women need 18 milligrams of iron a day. After menopause, women need 8 milligrams of iron a day. A pregnant woman needs 27 milligrams of iron a day. Infants and young children have higher iron needs relative to their size than other age groups. People who have lost blood because of ulcers or heavy menstrual periods may become very low in iron and may develop anemia. Most people can get the iron their bodies need by eating enough of certain iron-rich foods. Your doctor may recommend that you take an iron supplement along with eating an iron-rich diet. Follow-up care is a key part of your treatment and safety. Be sure to make and go to all appointments, and call your doctor if you are having problems. It's also a good idea to know your test results and keep a list of the medicines you take. How can you care for yourself at home? · Make iron-rich foods a part of your daily diet. Iron-rich foods include:  ¨ All meats, such as chicken, beef, lamb, pork, fish, and shellfish. Liver is especially high in iron. ¨ Leafy green vegetables. ¨ Raisins, peas, beans, lentils, barley, and eggs. ¨ Iron-fortified breakfast cereals. · Eat foods with vitamin C along with iron-rich foods. Vitamin C helps you absorb more iron from food.  Drink a glass of orange juice or another citrus juice with your food. · Eat meat and vegetables or grains together. The iron in meat helps your body absorb the iron in other foods. Where can you learn more? Go to http://lucien-morgan.info/. Enter 0328 [id number] in the search box to learn more about \"Iron-Rich Diet: Care Instructions. \"  Current as of: May 12, 2017  Content Version: 11.4  © 1473-3528 [employer]. Care instructions adapted under license by [employer] (which disclaims liability or warranty for this information). If you have questions about a medical condition or this instruction, always ask your healthcare professional. [address] any warranty or liability for your use of this information. Complete Blood Count (CBC): About This Test  What is it? A complete blood count (CBC) is a blood test that gives important information about your blood cells, especially red blood cells, white blood cells, and platelets. Why is this test done? A CBC may be done as part of a regular physical exam. There are many other reasons that a doctor may want this blood test, including to:  · Find the cause of symptoms such as fatigue, weakness, fever, bruising, or weight loss. · Find anemia or an infection. · See how much blood has been lost if there is bleeding. · Diagnose diseases of the blood, such as leukemia or polycythemia. How can you prepare for the test?  You do not need to do anything before having this test.  What happens during the test?  The health professional taking a sample of your blood will:  · Wrap an elastic band around your upper arm. This makes the veins below the band larger so it is easier to put a needle into the vein. · Clean the needle site with alcohol. · Put the needle into the vein. · Attach a tube to the needle to fill it with blood. · Remove the band from your arm when enough blood is collected.   · Put a gauze pad or cotton ball over the needle site as the needle is removed. · Put pressure on the site and then put on a bandage. If this blood test is done on a baby, a heel stick may be done instead of a blood draw from a vein. What happens after the test?  · You will probably be able to go home right away. · You can go back to your usual activities right away. Follow-up care is a key part of your treatment and safety. Be sure to make and go to all appointments, and call your doctor if you are having problems. It's also a good idea to keep a list of the medicines you take. Ask your doctor when you can expect to have your test results. Where can you learn more? Go to http://lucien-morgan.info/. Enter N490 in the search box to learn more about \"Complete Blood Count (CBC): About This Test.\"  Current as of: October 14, 2016  Content Version: 11.4  © 3883-3380 Healthwise, Incorporated. Care instructions adapted under license by [employer] (which disclaims liability or warranty for this information). If you have questions about a medical condition or this instruction, always ask your healthcare professional. [address] any warranty or liability for your use of this information.

## 2018-05-14 NOTE — MR AVS SNAPSHOT
[address] 200 [facility] [address] 
[phone] Patient: Celeste May MRN: [id number] GXV:[id number]/3/[id number] Visit Information Date & Time Provider Department Dept. Phone Encounter #  
 5/14/2018  9:30 AM Nimesh Gutierrez MD [facility] [phone] [id number] Your Appointments 6/5/2018 10:00 AM  
Follow Up with Lucinda Beltrán MD  
[facility]: 1 month f/up 100 [facility] [address] Pending sale to [facility] [address] [address]  
  
    
 6/25/2018  9:45 AM  
Office Visit with Nimesh Gutierrez MD  
[address]  Oncology [facility]) Appt Note: 6 week [place] [address], [place] 811 Pending sale to [facility] 3200 [facility], [address] [place] [address] 200 [facility] Upcoming Health Maintenance Date Due Hepatitis C Screening 1965 Pneumococcal 19-64 Medium Risk (1 of 1 - PPSV23) 3/1/1982 DTaP/Tdap/Td series (1 - Tdap) 3/1/1984 PAP AKA CERVICAL CYTOLOGY 3/1/1984 BREAST CANCER SCRN MAMMOGRAM 3/1/2013 FOBT Q 1 YEAR AGE 50-75 3/1/2013 Influenza Age 5 to Adult 8/1/2018 Allergies as of 5/14/2018  Review Complete On: 5/14/2018 By: Nimesh Gutierrez MD  
 No Known Allergies Current Immunizations  Never Reviewed No immunizations on file. Not reviewed this visit Vitals BP Pulse Temp Weight(growth percentile) BMI OB Status 127/79 96 98.1 °F (36.7 °C) (Oral) 166 lb (75.3 kg) 26 kg/m2 Having regular periods Smoking Status Current Every Day Smoker BMI and BSA Data Body Mass Index Body Surface Area  
 26 kg/m 2 1.89 m 2 Your Updated Medication List  
  
   
 This list is accurate as of 5/14/18 10:20 AM.  Always use your most recent med list.  
  
  
  
  
 cyclobenzaprine 10 mg tablet Commonly known as:  FLEXERIL Take  by mouth three (3) times daily as needed for Muscle Spasm(s). Diclofenac Potassium 50 mg Pwpk Take  by mouth three (3) times daily. PLAVIX 75 mg Tab Generic drug:  clopidogrel Take  by mouth. Patient Instructions Iron Deficiency Anemia: Care Instructions Your Care Instructions Anemia means that you do not have enough red blood cells. Red blood cells carry oxygen around your body. When you have anemia, it can make you pale, weak, and tired. Many things can cause anemia. The most common cause is loss of blood. This can happen if you have heavy menstrual periods. It can also happen if you have bleeding in your stomach or bowel. You can also get anemia if you don't have enough iron in your diet or if it's hard for your body to absorb iron. In some cases, pregnancy causes anemia. That's because a pregnant woman needs more iron. Your doctor may do more tests to find the cause of your anemia. If a disease or other health problem is causing it, your doctor will treat that problem. It's important to follow up with your doctor to make sure that your iron level returns to normal. 
Follow-up care is a key part of your treatment and safety. Be sure to make and go to all appointments, and call your doctor if you are having problems. It's also a good idea to know your test results and keep a list of the medicines you take. How can you care for yourself at home? · If your doctor recommended iron pills, take them as directed. ¨ Try to take the pills on an empty stomach. You can do this about 1 hour before or 2 hours after meals. But you may need to take iron with food to avoid an upset stomach.  
¨ Do not take antacids or drink milk or anything with caffeine within 2 hours of when you take your iron. They can keep your body from absorbing the iron well. ¨ Vitamin C helps your body absorb iron. You may want to take iron pills with a glass of orange juice or some other food high in vitamin C. 
¨ Iron pills may cause stomach problems. These include heartburn, nausea, diarrhea, constipation, and cramps. It can help to drink plenty of fluids and include fruits, vegetables, and fiber in your diet. ¨ It's normal for iron pills to make your stool a greenish or grayish black. But internal bleeding can also cause dark stool. So it's important to tell your doctor about any color changes. ¨ Call your doctor if you think you are having a problem with your iron pills. Even after you start to feel better, it will take several months for your body to build up its supply of iron. ¨ If you miss a pill, don't take a double dose. ¨ Keep iron pills out of the reach of small children. Too much iron can be very dangerous. · Eat foods with a lot of iron. These include red meat, shellfish, poultry, and eggs. They also include beans, raisins, whole-grain bread, and leafy green vegetables. · Steam your vegetables. This is the best way to prepare them if you want to get as much iron as possible. · Be safe with medicines. Do not take nonsteroidal anti-inflammatory pain relievers unless your doctor tells you to. These include aspirin, naproxen (Aleve), and ibuprofen (Advil, Motrin). · Liquid iron can stain your teeth. But you can mix it with water or juice and drink it with a straw. Then it won't get on your teeth. When should you call for help? Call 911 anytime you think you may need emergency care. For example, call if: 
? · You passed out (lost consciousness). ?Call your doctor now or seek immediate medical care if: 
? · You are short of breath. ? · You are dizzy or light-headed, or you feel like you may faint. ? · You have new or worse bleeding. ?Watch closely for changes in your health, and be sure to contact your doctor if: 
? · You feel weaker or more tired than usual.  
? · You do not get better as expected. Where can you learn more? Go to http://lucien-morgan.info/. Enter A980 in the search box to learn more about \"Iron Deficiency Anemia: Care Instructions. \" Current as of: October 13, 2016 Content Version: 11.4 © 1458-4830 [employer]. Care instructions adapted under license by [employer] (which disclaims liability or warranty for this information). If you have questions about a medical condition or this instruction, always ask your healthcare professional. [place] [address] any warranty or liability for your use of this information. Iron-Rich Diet: Care Instructions Your Care Instructions Your body needs iron to make hemoglobin. Hemoglobin is a substance in red blood cells that carries oxygen from the lungs to cells all through your body. If you do not get enough iron, your body makes fewer and smaller red blood cells. As a result, your body's cells may not get enough oxygen. Adult men need 8 milligrams of iron a day; adult women need 18 milligrams of iron a day. After menopause, women need 8 milligrams of iron a day. A pregnant woman needs 27 milligrams of iron a day. Infants and young children have higher iron needs relative to their size than other age groups. People who have lost blood because of ulcers or heavy menstrual periods may become very low in iron and may develop anemia. Most people can get the iron their bodies need by eating enough of certain iron-rich foods. Your doctor may recommend that you take an iron supplement along with eating an iron-rich diet. Follow-up care is a key part of your treatment and safety.  Be sure to make and go to all appointments, and call your doctor if you are having problems. It's also a good idea to know your test results and keep a list of the medicines you take. How can you care for yourself at home? · Make iron-rich foods a part of your daily diet. Iron-rich foods include: ¨ All meats, such as chicken, beef, lamb, pork, fish, and shellfish. Liver is especially high in iron. ¨ Leafy green vegetables. ¨ Raisins, peas, beans, lentils, barley, and eggs. ¨ Iron-fortified breakfast cereals. · Eat foods with vitamin C along with iron-rich foods. Vitamin C helps you absorb more iron from food. Drink a glass of orange juice or another citrus juice with your food. · Eat meat and vegetables or grains together. The iron in meat helps your body absorb the iron in other foods. Where can you learn more? Go to http://lucienBLiNQ Mediamorgan.info/. Enter [id number] [id number] in the search box to learn more about \"Iron-Rich Diet: Care Instructions. \" Current as of: May 12, 2017 Content Version: 11.4 © 8391-6063 [employer]. Care instructions adapted under license by [employer] (which disclaims liability or warranty for this information). If you have questions about a medical condition or this instruction, always ask your healthcare professional. [address] any warranty or liability for your use of this information. Complete Blood Count (CBC): About This Test 
What is it? A complete blood count (CBC) is a blood test that gives important information about your blood cells, especially red blood cells, white blood cells, and platelets. Why is this test done? A CBC may be done as part of a regular physical exam. There are many other reasons that a doctor may want this blood test, including to: · Find the cause of symptoms such as fatigue, weakness, fever, bruising, or weight loss. · Find anemia or an infection. · See how much blood has been lost if there is bleeding. · Diagnose diseases of the blood, such as leukemia or polycythemia. How can you prepare for the test? 
You do not need to do anything before having this test. 
What happens during the test? 
The health professional taking a sample of your blood will: · Wrap an elastic band around your upper arm. This makes the veins below the band larger so it is easier to put a needle into the vein. · Clean the needle site with alcohol. · Put the needle into the vein. · Attach a tube to the needle to fill it with blood. · Remove the band from your arm when enough blood is collected. · Put a gauze pad or cotton ball over the needle site as the needle is removed. · Put pressure on the site and then put on a bandage. If this blood test is done on a baby, a heel stick may be done instead of a blood draw from a vein. What happens after the test? 
· You will probably be able to go home right away. · You can go back to your usual activities right away. Follow-up care is a key part of your treatment and safety. Be sure to make and go to all appointments, and call your doctor if you are having problems. It's also a good idea to keep a list of the medicines you take. Ask your doctor when you can expect to have your test results. Where can you learn more? Go to http://lucien-morgan.info/. Enter S439 in the search box to learn more about \"Complete Blood Count (CBC): About This Test.\" Current as of: October 14, 2016 Content Version: 11.4 © 6928-8887 Healthwise, Incorporated. Care instructions adapted under license by [employer] (which disclaims liability or warranty for this information). If you have questions about a medical condition or this instruction, always ask your healthcare professional. [place] [address] any warranty or liability for your use of this information. Introducing [facility] & HEALTH [facility]!    
 [employer] introduces [employer] patient portal. Now you can access parts of your medical record, email your doctor's office, and request medication refills online. 1. In your internet browser, go to [email or web address] 2. Click on the First Time User? Click Here link in the Sign In box. You will see the New Member Sign Up page. 3. Enter your [employer] Access Code exactly as it appears below. You will not need to use this code after youve completed the sign-up process. If you do not sign up before the expiration date, you must request a new code. · [employer] Access Code: [email or web address] Expires: 7/29/2018 12:14 PM 
 
4. Enter the last four digits of your Social Security Number (xxxx) and Date of Birth (mm/dd/yyyy) as indicated and click Submit. You will be taken to the next sign-up page. 5. Create a [employer] ID. This will be your [employer] login ID and cannot be changed, so think of one that is secure and easy to remember. 6. Create a [employer] password. You can change your password at any time. 7. Enter your Password Reset Question and Answer. This can be used at a later time if you forget your password. 8. Enter your e-mail address. You will receive e-mail notification when new information is available in [address] 9. Click Sign Up. You can now view and download portions of your medical record. 10. Click the Download Summary menu link to download a portable copy of your medical information. If you have questions, please visit the Frequently Asked Questions section of the [employer] website. Remember, [employer] is NOT to be used for urgent needs. For medical emergencies, dial 911. Now available from your iPhone and Android! Please provide this summary of care documentation to your next provider. Your primary care clinician is listed as Odilon Rios. If you have any questions after today's visit, please call [phone].

## 2018-05-16 ENCOUNTER — HOSPITAL ENCOUNTER (OUTPATIENT)
Dept: INFUSION THERAPY | Age: 53
Discharge: HOME | End: 2018-05-16
Payer: SELF-PAY

## 2018-05-16 VITALS
HEART RATE: 81 BPM | RESPIRATION RATE: 16 BRPM | BODY MASS INDEX: 26 KG/M2 | DIASTOLIC BLOOD PRESSURE: 79 MMHG | SYSTOLIC BLOOD PRESSURE: 127 MMHG | TEMPERATURE: 98.1 F | WEIGHT: 166 LBS

## 2018-05-16 PROCEDURE — 74011250636 HC RX REV CODE- 250/636: Performed by: NURSE PRACTITIONER

## 2018-05-16 PROCEDURE — 96374 THER/PROPH/DIAG INJ IV PUSH: CPT

## 2018-05-16 RX ORDER — SIMVASTATIN 20 MG/1
20 TABLET, FILM COATED ORAL
COMMUNITY

## 2018-05-16 RX ORDER — AMLODIPINE BESYLATE 10 MG/1
10 TABLET ORAL DAILY
COMMUNITY

## 2018-05-16 RX ORDER — SODIUM CHLORIDE 0.9 % (FLUSH) 0.9 %
10-40 SYRINGE (ML) INJECTION AS NEEDED
Status: DISCONTINUED | OUTPATIENT
Start: 2018-05-16 | End: 2018-05-20 | Stop reason: HOSPADM

## 2018-05-16 RX ADMIN — Medication 20 ML: at 14:45

## 2018-05-16 RX ADMIN — Medication 10 ML: at 14:05

## 2018-05-16 RX ADMIN — FERRIC CARBOXYMALTOSE INJECTION 750 MG: 50 INJECTION, SOLUTION INTRAVENOUS at 14:35

## 2018-05-16 NOTE — PROGRESS NOTES
[facility] Progress Note    Date: May 16, 2018    Name: Dinorah Aviles    MRN: [id number]         : 1965      Ms. Quinonez arrived in the [facility] today at 1340, in stable condition, here for Dose # 1 of 2, IV Injectafer (Ferric Carboxymaltose). She was assessed and education was provided. Ms. Barry Kemp vitals were reviewed. Visit Vitals    /74 (BP 1 Location: Right arm, BP Patient Position: At rest;Sitting)    Pulse 90    Temp 98.1 °F (36.7 °C)    Resp 16    Wt 75.3 kg (166 lb)    Breastfeeding No    BMI 26 kg/m2             PIV was established in her dorsal left hand at 1405, without incident. Injectafer (Ferric Carboxymaltose) 750 mg IV, was administered over approximately 10 minutes, per order, and without incident. After completion of the IV Injectafer, the PIV was flushed well with 20 ml NS, and then, the PIV was clamped and left intact. Ms. Low Daley was monitored for 30 minutes, per order, and also without incident. After completion of the 30 minute monitoring period, the PIV was removed and gauze/bandaid was applied. Ms. Low Daley tolerated well, and had no complaints. Ms. Low Daley was discharged from [place] [address] in stable condition at 1520. Dani Murphy She is to return on next Wednesday, 18, at 1400, for her next appointment, for dose # 2 of 2, IV Injectafer.  Francis Sharma RN  May 16, 2018  2:00 PM

## 2018-05-23 ENCOUNTER — HOSPITAL ENCOUNTER (OUTPATIENT)
Dept: INFUSION THERAPY | Age: 53
Discharge: HOME | End: 2018-05-23
Payer: SELF-PAY

## 2018-05-23 VITALS
SYSTOLIC BLOOD PRESSURE: 120 MMHG | DIASTOLIC BLOOD PRESSURE: 80 MMHG | OXYGEN SATURATION: 98 % | RESPIRATION RATE: 20 BRPM | HEART RATE: 80 BPM | TEMPERATURE: 98.1 F

## 2018-05-23 PROCEDURE — 96374 THER/PROPH/DIAG INJ IV PUSH: CPT

## 2018-05-23 PROCEDURE — 74011250636 HC RX REV CODE- 250/636: Performed by: NURSE PRACTITIONER

## 2018-05-23 RX ORDER — SODIUM CHLORIDE 0.9 % (FLUSH) 0.9 %
5-10 SYRINGE (ML) INJECTION EVERY 8 HOURS
Status: DISCONTINUED | OUTPATIENT
Start: 2018-05-23 | End: 2018-05-27 | Stop reason: HOSPADM

## 2018-05-23 RX ADMIN — Medication 10 ML: at 14:36

## 2018-05-23 RX ADMIN — FERRIC CARBOXYMALTOSE INJECTION 750 MG: 50 INJECTION, SOLUTION INTRAVENOUS at 14:18

## 2018-05-23 NOTE — PROGRESS NOTES
[facility] Progress Note    Date: May 23, 2018    Name: Neo Coello    MRN: [id number]         : 1965      Ms. Quinonez arrived in the [facility] today at 1400, in stable condition, here for Dose # 2 of 2, IV Injectafer (Ferric Carboxymaltose). She was assessed and education was provided. Ms. Shani Sheth stated that she tolerated dose 1 without any incident. Ms. Hunter Patches vitals were reviewed. Patient Vitals for the past 12 hrs:   Temp Pulse Resp BP SpO2   18 1437 98.1 °F (36.7 °C) 80 20 120/80 98 %   18 1405 98.8 °F (37.1 °C) 90 20 114/78 95 %       Visit Vitals    /80 (BP 1 Location: Right arm, BP Patient Position: At rest)    Pulse 80    Temp 98.1 °F (36.7 °C)    Resp 20    SpO2 98%     PIV in right forearm x2 attempts. Injectafer (Ferric Carboxymaltose) 750 mg IV, was administered over approximately 10 minutes, per order, and without incident. After completion of the IV Injectafer, the PIV was flushed well with 20 ml NS, and then, the PIV was dc'd. Ms. Shani Sheth chose not to stay for 30 minute observation today. Ms. Shani Sheth tolerated well, and had no complaints. Ms. Shani Sheth was discharged from [place] [address] in stable condition at 1440. Ms. Shani Sheth has no further appointments here at [facility]. Ms. Shani Sheth does follow up with Dr. Veronica Lee in .     Tomás Dudley, RN  May 23, 2018

## 2018-06-25 ENCOUNTER — HOSPITAL ENCOUNTER (OUTPATIENT)
Dept: LAB | Age: 53
Discharge: HOME | End: 2018-06-25
Payer: SELF-PAY

## 2018-06-25 ENCOUNTER — OFFICE VISIT (OUTPATIENT)
Dept: ONCOLOGY | Age: 53
End: 2018-06-25

## 2018-06-25 ENCOUNTER — HOSPITAL ENCOUNTER (OUTPATIENT)
Dept: ONCOLOGY | Age: 53
Discharge: HOME | End: 2018-06-25

## 2018-06-25 VITALS
TEMPERATURE: 98.4 F | DIASTOLIC BLOOD PRESSURE: 75 MMHG | WEIGHT: 175 LBS | BODY MASS INDEX: 27.41 KG/M2 | SYSTOLIC BLOOD PRESSURE: 132 MMHG | HEART RATE: 78 BPM

## 2018-06-25 DIAGNOSIS — D50.8 IRON DEFICIENCY ANEMIA SECONDARY TO INADEQUATE DIETARY IRON INTAKE: ICD-10-CM

## 2018-06-25 DIAGNOSIS — D50.8 IRON DEFICIENCY ANEMIA SECONDARY TO INADEQUATE DIETARY IRON INTAKE: Primary | ICD-10-CM

## 2018-06-25 LAB
ALBUMIN SERPL-MCNC: 3.4 G/DL (ref 3.4–5)
ALBUMIN/GLOB SERPL: 1 {RATIO} (ref 0.8–1.7)
ALP SERPL-CCNC: 84 U/L (ref 45–117)
ALT SERPL-CCNC: 21 U/L (ref 13–56)
ANION GAP SERPL CALC-SCNC: 8 MMOL/L (ref 3–18)
AST SERPL-CCNC: 12 U/L (ref 15–37)
BASOPHILS # BLD: 0 K/UL (ref 0–0.06)
BASOPHILS NFR BLD: 0 % (ref 0–3)
BILIRUB SERPL-MCNC: 0.2 MG/DL (ref 0.2–1)
BUN SERPL-MCNC: 13 MG/DL (ref 7–18)
BUN/CREAT SERPL: 19 (ref 12–20)
CALCIUM SERPL-MCNC: 8.9 MG/DL (ref 8.5–10.1)
CHLORIDE SERPL-SCNC: 105 MMOL/L (ref 100–108)
CO2 SERPL-SCNC: 26 MMOL/L (ref 21–32)
CREAT SERPL-MCNC: 0.68 MG/DL (ref 0.6–1.3)
DIFFERENTIAL METHOD BLD: ABNORMAL
EOSINOPHIL # BLD: 0.3 K/UL (ref 0–0.4)
EOSINOPHIL NFR BLD: 4 % (ref 0–5)
GLOBULIN SER CALC-MCNC: 3.4 G/DL (ref 2–4)
GLUCOSE SERPL-MCNC: 108 MG/DL (ref 74–99)
HCT VFR BLD AUTO: 33 % (ref 35–45)
HGB BLD-MCNC: 10.7 G/DL (ref 12–16)
LYMPHOCYTES # BLD: 2.4 K/UL (ref 0.8–3.5)
LYMPHOCYTES NFR BLD: 34 % (ref 20–51)
MCH RBC QN AUTO: 25.8 PG (ref 24–34)
MCHC RBC AUTO-ENTMCNC: 32.4 G/DL (ref 31–37)
MCV RBC AUTO: 79.7 FL (ref 74–97)
MONOCYTES # BLD: 0.7 K/UL (ref 0–1)
MONOCYTES NFR BLD: 10 % (ref 2–9)
NEUTS SEG # BLD: 3.6 K/UL (ref 1.8–8)
NEUTS SEG NFR BLD: 52 % (ref 42–75)
PLATELET # BLD AUTO: 359 K/UL (ref 135–420)
PMV BLD AUTO: 8.8 FL (ref 9.2–11.8)
POTASSIUM SERPL-SCNC: 4 MMOL/L (ref 3.5–5.5)
PROT SERPL-MCNC: 6.8 G/DL (ref 6.4–8.2)
RBC # BLD AUTO: 4.14 M/UL (ref 4.2–5.3)
RBC MORPH BLD: ABNORMAL
SODIUM SERPL-SCNC: 139 MMOL/L (ref 136–145)
WBC # BLD AUTO: 7 K/UL (ref 4.6–13.2)

## 2018-06-25 PROCEDURE — 83540 ASSAY OF IRON: CPT | Performed by: INTERNAL MEDICINE

## 2018-06-25 PROCEDURE — 80053 COMPREHEN METABOLIC PANEL: CPT | Performed by: INTERNAL MEDICINE

## 2018-06-25 PROCEDURE — 36415 COLL VENOUS BLD VENIPUNCTURE: CPT | Performed by: INTERNAL MEDICINE

## 2018-06-25 PROCEDURE — 82728 ASSAY OF FERRITIN: CPT | Performed by: INTERNAL MEDICINE

## 2018-06-25 NOTE — PROGRESS NOTES
Hematology/medical oncology progress note    6/25/2018  Hima Quinonez  YOB: 1965    PCP: Dr. Kajal Mari    Diagnosis: Iron deficiency anemia    Ms. Lizbeth Smith is a 25-year-old woman who presented on 4/30/2018 with quite severe anemia. At the time she had a hemoglobin of 7.9 g/dL with hematocrit of 27.4%. Her ferritin level was on 5 ng/mL. She completed 2 doses of Injectafer on 5/23/2018. The CBC from today shows that she is responding favorably with the current hemoglobin of 10.7 g/dL and hematocrit of 34%. Her WBC count is 6.9 and the platelet count was 459,399. I will plan to have her return for a complete reassessment again in 8 weeks. At the time of her next follow-up we will recheck her iron profile and ferritin levels. The patient had her questions answered to her satisfaction. Total time 25 minutes, greater than 50% of the time was in counseling and coordination of care. Jimmy Pride MD, [address]

## 2018-06-25 NOTE — PATIENT INSTRUCTIONS
Iron Deficiency Anemia: Care Instructions  Your Care Instructions    Anemia means that you do not have enough red blood cells. Red blood cells carry oxygen around your body. When you have anemia, it can make you pale, weak, and tired. Many things can cause anemia. The most common cause is loss of blood. This can happen if you have heavy menstrual periods. It can also happen if you have bleeding in your stomach or bowel. You can also get anemia if you don't have enough iron in your diet or if it's hard for your body to absorb iron. In some cases, pregnancy causes anemia. That's because a pregnant woman needs more iron. Your doctor may do more tests to find the cause of your anemia. If a disease or other health problem is causing it, your doctor will treat that problem. It's important to follow up with your doctor to make sure that your iron level returns to normal.  Follow-up care is a key part of your treatment and safety. Be sure to make and go to all appointments, and call your doctor if you are having problems. It's also a good idea to know your test results and keep a list of the medicines you take. How can you care for yourself at home? · If your doctor recommended iron pills, take them as directed. ¨ Try to take the pills on an empty stomach. You can do this about 1 hour before or 2 hours after meals. But you may need to take iron with food to avoid an upset stomach. ¨ Do not take antacids or drink milk or anything with caffeine within 2 hours of when you take your iron. They can keep your body from absorbing the iron well. ¨ Vitamin C helps your body absorb iron. You may want to take iron pills with a glass of orange juice or some other food high in vitamin C.  ¨ Iron pills may cause stomach problems. These include heartburn, nausea, diarrhea, constipation, and cramps. It can help to drink plenty of fluids and include fruits, vegetables, and fiber in your diet.   ¨ It's normal for iron pills to make your stool a greenish or grayish black. But internal bleeding can also cause dark stool. So it's important to tell your doctor about any color changes. ¨ Call your doctor if you think you are having a problem with your iron pills. Even after you start to feel better, it will take several months for your body to build up its supply of iron. ¨ If you miss a pill, don't take a double dose. ¨ Keep iron pills out of the reach of small children. Too much iron can be very dangerous. · Eat foods with a lot of iron. These include red meat, shellfish, poultry, and eggs. They also include beans, raisins, whole-grain bread, and leafy green vegetables. · Steam your vegetables. This is the best way to prepare them if you want to get as much iron as possible. · Be safe with medicines. Do not take nonsteroidal anti-inflammatory pain relievers unless your doctor tells you to. These include aspirin, naproxen (Aleve), and ibuprofen (Advil, Motrin). · Liquid iron can stain your teeth. But you can mix it with water or juice and drink it with a straw. Then it won't get on your teeth. When should you call for help? Call 911 anytime you think you may need emergency care. For example, call if:  ? · You passed out (lost consciousness). ?Call your doctor now or seek immediate medical care if:  ? · You are short of breath. ? · You are dizzy or light-headed, or you feel like you may faint. ? · You have new or worse bleeding. ? Watch closely for changes in your health, and be sure to contact your doctor if:  ? · You feel weaker or more tired than usual.   ? · You do not get better as expected. Where can you learn more? Go to http://lucien-morgan.info/. Enter E903 in the search box to learn more about \"Iron Deficiency Anemia: Care Instructions. \"  Current as of: October 13, 2016  Content Version: 11.4  © 4133-5397 [employer].  Care instructions adapted under license by [employer] (which disclaims liability or warranty for this information). If you have questions about a medical condition or this instruction, always ask your healthcare professional. [address] any warranty or liability for your use of this information.

## 2018-06-26 LAB
FERRITIN SERPL-MCNC: 325 NG/ML (ref 8–388)
IRON SATN MFR SERPL: 16 %
IRON SERPL-MCNC: 56 UG/DL (ref 50–175)
TIBC SERPL-MCNC: 342 UG/DL (ref 250–450)

## 2018-08-20 ENCOUNTER — HOSPITAL ENCOUNTER (OUTPATIENT)
Dept: ONCOLOGY | Age: 53
Discharge: HOME | End: 2018-08-20

## 2018-08-20 DIAGNOSIS — D50.8 IRON DEFICIENCY ANEMIA SECONDARY TO INADEQUATE DIETARY IRON INTAKE: ICD-10-CM

## 2019-01-08 ENCOUNTER — APPOINTMENT (OUTPATIENT)
Dept: CT IMAGING | Age: 54
End: 2019-01-08
Attending: EMERGENCY MEDICINE
Payer: SELF-PAY

## 2019-01-08 ENCOUNTER — APPOINTMENT (OUTPATIENT)
Dept: GENERAL RADIOLOGY | Age: 54
End: 2019-01-08
Attending: PHYSICIAN ASSISTANT
Payer: SELF-PAY

## 2019-01-08 ENCOUNTER — HOSPITAL ENCOUNTER (EMERGENCY)
Age: 54
Discharge: HOME | End: 2019-01-08
Attending: EMERGENCY MEDICINE
Payer: SELF-PAY

## 2019-01-08 VITALS
SYSTOLIC BLOOD PRESSURE: 161 MMHG | HEART RATE: 90 BPM | TEMPERATURE: 98.2 F | DIASTOLIC BLOOD PRESSURE: 100 MMHG | WEIGHT: 171 LBS | OXYGEN SATURATION: 100 % | BODY MASS INDEX: 26.78 KG/M2 | RESPIRATION RATE: 16 BRPM

## 2019-01-08 DIAGNOSIS — R10.2 PELVIC PAIN: Primary | ICD-10-CM

## 2019-01-08 LAB
ALBUMIN SERPL-MCNC: 3.2 G/DL (ref 3.4–5)
ALBUMIN/GLOB SERPL: 0.6 {RATIO} (ref 0.8–1.7)
ALP SERPL-CCNC: 106 U/L (ref 45–117)
ALT SERPL-CCNC: 13 U/L (ref 13–56)
ANION GAP SERPL CALC-SCNC: 6 MMOL/L (ref 3–18)
APPEARANCE UR: CLEAR
AST SERPL-CCNC: 12 U/L (ref 15–37)
ATRIAL RATE: 85 BPM
BACTERIA URNS QL MICRO: ABNORMAL /HPF
BASOPHILS # BLD: 0.1 K/UL (ref 0–0.1)
BASOPHILS NFR BLD: 1 % (ref 0–2)
BILIRUB SERPL-MCNC: 0.4 MG/DL (ref 0.2–1)
BILIRUB UR QL: NEGATIVE
BUN SERPL-MCNC: 10 MG/DL (ref 7–18)
BUN/CREAT SERPL: 14 (ref 12–20)
CALCIUM SERPL-MCNC: 9.2 MG/DL (ref 8.5–10.1)
CALCULATED P AXIS, ECG09: 66 DEGREES
CALCULATED R AXIS, ECG10: 4 DEGREES
CALCULATED T AXIS, ECG11: 13 DEGREES
CHLORIDE SERPL-SCNC: 103 MMOL/L (ref 100–108)
CO2 SERPL-SCNC: 26 MMOL/L (ref 21–32)
COLOR UR: YELLOW
CREAT SERPL-MCNC: 0.73 MG/DL (ref 0.6–1.3)
DIAGNOSIS, 93000: NORMAL
DIFFERENTIAL METHOD BLD: ABNORMAL
EOSINOPHIL # BLD: 0.3 K/UL (ref 0–0.4)
EOSINOPHIL NFR BLD: 4 % (ref 0–5)
EPITH CASTS URNS QL MICRO: ABNORMAL /LPF (ref 0–5)
ERYTHROCYTE [DISTWIDTH] IN BLOOD BY AUTOMATED COUNT: 13.6 % (ref 11.6–14.5)
GLOBULIN SER CALC-MCNC: 5 G/DL (ref 2–4)
GLUCOSE SERPL-MCNC: 121 MG/DL (ref 74–99)
GLUCOSE UR STRIP.AUTO-MCNC: NEGATIVE MG/DL
HCT VFR BLD AUTO: 34.4 % (ref 35–45)
HGB BLD-MCNC: 11.4 G/DL (ref 12–16)
HGB UR QL STRIP: ABNORMAL
KETONES UR QL STRIP.AUTO: ABNORMAL MG/DL
KOH PREP SPEC: NORMAL
LEUKOCYTE ESTERASE UR QL STRIP.AUTO: ABNORMAL
LYMPHOCYTES # BLD: 1.4 K/UL (ref 0.9–3.6)
LYMPHOCYTES NFR BLD: 20 % (ref 21–52)
MAGNESIUM SERPL-MCNC: 2.1 MG/DL (ref 1.6–2.6)
MCH RBC QN AUTO: 26.5 PG (ref 24–34)
MCHC RBC AUTO-ENTMCNC: 33.1 G/DL (ref 31–37)
MCV RBC AUTO: 80 FL (ref 74–97)
MONOCYTES # BLD: 0.7 K/UL (ref 0.05–1.2)
MONOCYTES NFR BLD: 10 % (ref 3–10)
MUCOUS THREADS URNS QL MICRO: ABNORMAL /LPF
NEUTS SEG # BLD: 4.5 K/UL (ref 1.8–8)
NEUTS SEG NFR BLD: 65 % (ref 40–73)
NITRITE UR QL STRIP.AUTO: NEGATIVE
P-R INTERVAL, ECG05: 140 MS
PH UR STRIP: 6.5 [PH] (ref 5–8)
PLATELET # BLD AUTO: 372 K/UL (ref 135–420)
PMV BLD AUTO: 8.9 FL (ref 9.2–11.8)
POTASSIUM SERPL-SCNC: 3.7 MMOL/L (ref 3.5–5.5)
PROT SERPL-MCNC: 8.2 G/DL (ref 6.4–8.2)
PROT UR STRIP-MCNC: ABNORMAL MG/DL
Q-T INTERVAL, ECG07: 382 MS
QRS DURATION, ECG06: 76 MS
QTC CALCULATION (BEZET), ECG08: 454 MS
RBC # BLD AUTO: 4.3 M/UL (ref 4.2–5.3)
RBC #/AREA URNS HPF: ABNORMAL /HPF (ref 0–5)
SERVICE CMNT-IMP: NORMAL
SERVICE CMNT-IMP: NORMAL
SODIUM SERPL-SCNC: 135 MMOL/L (ref 136–145)
SP GR UR REFRACTOMETRY: 1.02 (ref 1–1.03)
UROBILINOGEN UR QL STRIP.AUTO: 1 EU/DL (ref 0.2–1)
VENTRICULAR RATE, ECG03: 85 BPM
WBC # BLD AUTO: 7 K/UL (ref 4.6–13.2)
WBC URNS QL MICRO: ABNORMAL /HPF (ref 0–4)
WET PREP GENITAL: NORMAL

## 2019-01-08 PROCEDURE — 96374 THER/PROPH/DIAG INJ IV PUSH: CPT

## 2019-01-08 PROCEDURE — 83735 ASSAY OF MAGNESIUM: CPT

## 2019-01-08 PROCEDURE — 74011250636 HC RX REV CODE- 250/636

## 2019-01-08 PROCEDURE — 87186 SC STD MICRODIL/AGAR DIL: CPT

## 2019-01-08 PROCEDURE — 87076 CULTURE ANAEROBE IDENT EACH: CPT

## 2019-01-08 PROCEDURE — 74011000250 HC RX REV CODE- 250: Performed by: EMERGENCY MEDICINE

## 2019-01-08 PROCEDURE — 74011636320 HC RX REV CODE- 636/320: Performed by: EMERGENCY MEDICINE

## 2019-01-08 PROCEDURE — 85025 COMPLETE CBC W/AUTO DIFF WBC: CPT

## 2019-01-08 PROCEDURE — 99284 EMERGENCY DEPT VISIT MOD MDM: CPT

## 2019-01-08 PROCEDURE — 71046 X-RAY EXAM CHEST 2 VIEWS: CPT

## 2019-01-08 PROCEDURE — 80053 COMPREHEN METABOLIC PANEL: CPT

## 2019-01-08 PROCEDURE — 93005 ELECTROCARDIOGRAM TRACING: CPT

## 2019-01-08 PROCEDURE — 87185 SC STD ENZYME DETCJ PER NZM: CPT

## 2019-01-08 PROCEDURE — 87070 CULTURE OTHR SPECIMN AEROBIC: CPT

## 2019-01-08 PROCEDURE — 74011250636 HC RX REV CODE- 250/636: Performed by: EMERGENCY MEDICINE

## 2019-01-08 PROCEDURE — 81001 URINALYSIS AUTO W/SCOPE: CPT

## 2019-01-08 PROCEDURE — 87491 CHLMYD TRACH DNA AMP PROBE: CPT

## 2019-01-08 PROCEDURE — 87210 SMEAR WET MOUNT SALINE/INK: CPT

## 2019-01-08 PROCEDURE — 96375 TX/PRO/DX INJ NEW DRUG ADDON: CPT

## 2019-01-08 PROCEDURE — 87077 CULTURE AEROBIC IDENTIFY: CPT

## 2019-01-08 PROCEDURE — 74177 CT ABD & PELVIS W/CONTRAST: CPT

## 2019-01-08 RX ORDER — METRONIDAZOLE 500 MG/1
500 TABLET ORAL 3 TIMES DAILY
Qty: 30 TAB | Refills: 0 | Status: SHIPPED | OUTPATIENT
Start: 2019-01-08 | End: 2019-01-18

## 2019-01-08 RX ORDER — CEFTRIAXONE 1 G/1
1 INJECTION, POWDER, FOR SOLUTION INTRAMUSCULAR; INTRAVENOUS
Status: DISCONTINUED | OUTPATIENT
Start: 2019-01-08 | End: 2019-01-08 | Stop reason: CLARIF

## 2019-01-08 RX ORDER — MORPHINE SULFATE 4 MG/ML
INJECTION INTRAVENOUS
Status: COMPLETED
Start: 2019-01-08 | End: 2019-01-08

## 2019-01-08 RX ORDER — DICYCLOMINE HYDROCHLORIDE 10 MG/1
20 CAPSULE ORAL
Status: DISCONTINUED | OUTPATIENT
Start: 2019-01-08 | End: 2019-01-08

## 2019-01-08 RX ORDER — MORPHINE SULFATE 4 MG/ML
4 INJECTION INTRAVENOUS
Status: COMPLETED | OUTPATIENT
Start: 2019-01-08 | End: 2019-01-08

## 2019-01-08 RX ORDER — DOXYCYCLINE 100 MG/1
100 CAPSULE ORAL 2 TIMES DAILY
Qty: 28 CAP | Refills: 0 | Status: SHIPPED | OUTPATIENT
Start: 2019-01-08 | End: 2019-01-22

## 2019-01-08 RX ADMIN — MORPHINE SULFATE 4 MG: 4 INJECTION INTRAVENOUS at 15:03

## 2019-01-08 RX ADMIN — IOPAMIDOL 100 ML: 612 INJECTION, SOLUTION INTRAVENOUS at 13:03

## 2019-01-08 RX ADMIN — WATER 1 G: 1 INJECTION INTRAMUSCULAR; INTRAVENOUS; SUBCUTANEOUS at 15:39

## 2019-01-08 NOTE — ED PROVIDER NOTES
EMERGENCY DEPARTMENT HISTORY AND PHYSICAL EXAM 
 
11:27 AM 
 
 
Date: 1/8/2019 Patient Name: Reina Antoine History of Presenting Illness Chief Complaint Patient presents with  Abdominal Pain  Leg Pain History Provided By: Patient Chief Complaint: Abd Pain Duration:  4 weeks Timing:  Waxing and waning Location: lower abd Quality: Cramping Severity: Moderate Modifying Factors: No relief with miralax Associated Symptoms: Leg pain Additional History (Context): 11:29 AM Reina Antoine is a 52 y.o. female with h/o Anemia, DM, HTN, and CVA who presents to ED complaining of waxing and waning moderate cramping lower abd pain onset 4 weeks. Patient states that the pain is also in her upper legs as well. Sates that she was recently seen at [facility] but did not receive a diagnosis. She denies seeing a GI doctor. She reports also having constipation and is taking miralax daily with no relief. Reports SHx of appendectomy. Denies any fever or vomiting. .No other concerns or symptoms at this time. PCP: Shadi Morley MD 
 
 
Current Facility-Administered Medications Medication Dose Route Frequency Provider Last Rate Last Dose  cefTRIAXone (ROCEPHIN) 1 g in sterile water (preservative free) 10 mL IV syringe  1 g IntraVENous NOW Sheila Bowman MD      
 
Current Outpatient Medications Medication Sig Dispense Refill  doxycycline (MONODOX) 100 mg capsule Take 1 Cap by mouth two (2) times a day for 14 days. 28 Cap 0  
 metroNIDAZOLE (FLAGYL) 500 mg tablet Take 1 Tab by mouth three (3) times daily for 10 days. 30 Tab 0  
 amLODIPine (NORVASC) 10 mg tablet Take 10 mg by mouth daily. Indications: hypertension  simvastatin (ZOCOR) 20 mg tablet Take 20 mg by mouth nightly.  multivitamin, tx-iron-ca-min (THERA-M W/ IRON) 9 mg iron-400 mcg tab tablet Take 1 Tab by mouth daily.  clopidogrel (PLAVIX) 75 mg tab Take 75 mg by mouth daily. Past History Past Medical History: 
Past Medical History:  
Diagnosis Date  Anemia  Constipation  Diabetes ([address]  Hypertension  Poor appetite  Stomach pain  Stroke [facility])   
 no effects Past Surgical History: 
Past Surgical History:  
Procedure Laterality Date  HX APPENDECTOMY Family History: 
Family History Problem Relation Age of Onset  Cancer Mother  Hypertension Mother  Diabetes Mother  Stroke Mother  Cancer Sister   
     cervical  
 Hypertension Sister  Cancer Brother   
     pancreatic  Hypertension Brother  Diabetes Brother  Cancer Sister   
     colon  Hypertension Sister Social History: 
Social History Tobacco Use  Smoking status: Current Every Day Smoker Packs/day: 0.25  Smokeless tobacco: Never Used Substance Use Topics  Alcohol use: Yes Comment: occassionally  Drug use: No  
 
 
Allergies: 
No Known Allergies Review of Systems Review of Systems Constitutional: Negative for diaphoresis and fever. HENT: Negative for congestion and sore throat. Eyes: Negative for pain and itching. Respiratory: Negative for cough and shortness of breath. Cardiovascular: Negative for chest pain and palpitations. Gastrointestinal: Positive for abdominal pain and constipation. Negative for diarrhea. Endocrine: Negative for polydipsia and polyuria. Genitourinary: Negative for dysuria and hematuria. Musculoskeletal: Negative for arthralgias and myalgias. Bilateral upper leg pain Skin: Negative for rash and wound. Neurological: Negative for seizures and syncope. Hematological: Does not bruise/bleed easily. Psychiatric/Behavioral: Negative for agitation and hallucinations. Physical Exam  
 
Patient Vitals for the past 12 hrs: 
 Temp Pulse Resp BP SpO2  
01/08/19 0933 98.2 °F (36.8 °C) 90 16 (!) 161/100 100 % Physical Exam  
 Constitutional: She appears well-developed and well-nourished. HENT:  
Head: Normocephalic and atraumatic. Eyes: Conjunctivae are normal. No scleral icterus. Neck: Normal range of motion. Neck supple. No JVD present. Cardiovascular: Normal rate, regular rhythm and intact distal pulses. Pulmonary/Chest: Effort normal. No respiratory distress. Abdominal: There is tenderness (LLQ > RLQ). Genitourinary:  
Genitourinary Comments: Gurwinder Goel RN as chaperone Large amount of watery sanguinous fluid and sample sent to lab Minimal discomfort during exam  
Musculoskeletal: Normal range of motion. Neurological: She is alert. Skin: Skin is warm and dry. Psychiatric: Judgment and thought content normal.  
Nursing note and vitals reviewed. Diagnostic Study Results Labs - Recent Results (from the past 12 hour(s)) URINALYSIS W/ RFLX MICROSCOPIC Collection Time: 01/08/19 10:14 AM  
Result Value Ref Range Color YELLOW Appearance CLEAR Specific gravity 1.021 1.005 - 1.030    
 pH (UA) 6.5 5.0 - 8.0 Protein TRACE (A) NEG mg/dL Glucose NEGATIVE  NEG mg/dL Ketone TRACE (A) NEG mg/dL Bilirubin NEGATIVE  NEG Blood SMALL (A) NEG Urobilinogen 1.0 0.2 - 1.0 EU/dL Nitrites NEGATIVE  NEG Leukocyte Esterase MODERATE (A) NEG URINE MICROSCOPIC ONLY Collection Time: 01/08/19 10:14 AM  
Result Value Ref Range WBC 15 to 20 0 - 4 /hpf  
 RBC 3 to 5 0 - 5 /hpf Epithelial cells 1+ 0 - 5 /lpf Bacteria 2+ (A) NEG /hpf Mucus 2+ (A) NEG /lpf EKG, 12 LEAD, INITIAL Collection Time: 01/08/19 10:17 AM  
Result Value Ref Range Ventricular Rate 85 BPM  
 Atrial Rate 85 BPM  
 P-R Interval 140 ms QRS Duration 76 ms  
 Q-T Interval 382 ms QTC Calculation (Bezet) 454 ms Calculated P Axis 66 degrees Calculated R Axis 4 degrees Calculated T Axis 13 degrees Diagnosis Normal sinus rhythm Moderate voltage criteria for LVH, may be normal variant Borderline ECG No previous ECGs available Confirmed by Yvette Aviles ([id number]) on 1/8/2019 1:07:52 PM 
  
CBC WITH AUTOMATED DIFF Collection Time: 01/08/19 10:30 AM  
Result Value Ref Range WBC 7.0 4.6 - 13.2 K/uL  
 RBC 4.30 4.20 - 5.30 M/uL  
 HGB 11.4 (L) 12.0 - 16.0 g/dL HCT 34.4 (L) 35.0 - 45.0 % MCV 80.0 74.0 - 97.0 FL  
 MCH 26.5 24.0 - 34.0 PG  
 MCHC 33.1 31.0 - 37.0 g/dL  
 RDW 13.6 11.6 - 14.5 % PLATELET 751 270 - 526 K/uL MPV 8.9 (L) 9.2 - 11.8 FL  
 NEUTROPHILS 65 40 - 73 % LYMPHOCYTES 20 (L) 21 - 52 % MONOCYTES 10 3 - 10 % EOSINOPHILS 4 0 - 5 % BASOPHILS 1 0 - 2 %  
 ABS. NEUTROPHILS 4.5 1.8 - 8.0 K/UL  
 ABS. LYMPHOCYTES 1.4 0.9 - 3.6 K/UL  
 ABS. MONOCYTES 0.7 0.05 - 1.2 K/UL  
 ABS. EOSINOPHILS 0.3 0.0 - 0.4 K/UL  
 ABS. BASOPHILS 0.1 0.0 - 0.1 K/UL  
 DF AUTOMATED METABOLIC PANEL, COMPREHENSIVE Collection Time: 01/08/19 10:30 AM  
Result Value Ref Range Sodium 135 (L) 136 - 145 mmol/L Potassium 3.7 3.5 - 5.5 mmol/L Chloride 103 100 - 108 mmol/L  
 CO2 26 21 - 32 mmol/L Anion gap 6 3.0 - 18 mmol/L Glucose 121 (H) 74 - 99 mg/dL BUN 10 7.0 - 18 MG/DL Creatinine 0.73 0.6 - 1.3 MG/DL  
 BUN/Creatinine ratio 14 12 - 20 GFR est AA >60 >60 ml/min/1.73m2 GFR est non-AA >60 >60 ml/min/1.73m2 Calcium 9.2 8.5 - 10.1 MG/DL Bilirubin, total 0.4 0.2 - 1.0 MG/DL  
 ALT (SGPT) 13 13 - 56 U/L  
 AST (SGOT) 12 (L) 15 - 37 U/L Alk. phosphatase 106 45 - 117 U/L Protein, total 8.2 6.4 - 8.2 g/dL Albumin 3.2 (L) 3.4 - 5.0 g/dL Globulin 5.0 (H) 2.0 - 4.0 g/dL A-G Ratio 0.6 (L) 0.8 - 1.7 MAGNESIUM Collection Time: 01/08/19 10:30 AM  
Result Value Ref Range Magnesium 2.1 1.6 - 2.6 mg/dL Radiologic Studies -  
CT ABD PELV W CONT Final Result IMPRESSION[de-identified]  
  
1.  Trace haziness in the pelvis. Might be from GYN structures.  No definite  
ovarian cyst.   
 - Enlarged uterus with some heterogeneity which may indicate presence of  
fibroids.   
-Small amount of air within the lower uterine segment which may relate to  
physical examination. If there has been no recent instrumentation/examination,  
then infection becomes a differential possibility. 2. No bowel findings. Thank you for this referral.  
  
XR CHEST PA LAT Final Result IMPRESSION:  
  
No acute cardiopulmonary disease. Xr Chest Pa Lat Result Date: 1/8/2019 Chest PA and lateral INDICATION: Chest pain COMPARISON: None FINDINGS: Two views of the chest were obtained. The lungs are clear. Cardiac silhouette is normal. Pulmonary vasculature is unremarkable. Osseous structures are intact. IMPRESSION: No acute cardiopulmonary disease. Ct Abd Pelv W Cont Result Date: 1/8/2019 CT SCAN ABDOMEN AND PELVIS WITH IV CONTRAST HISTORY: LLQ pain, suspect diverticulitis ; lower abdominal region pain over the past one month, has now become more severe. Prior appendectomy. COMPARISON: None TECHNIQUE: After the intravenous administration of iodinated IV contrast, without oral contrast, contiguous 5 mm axial scans were obtained through the abdomen and pelvis. Contrast used 100 cc  Isovue 300 CT scans at this facility are performed using dose optimization technique as appropriate with performed exam, to include automated exposure control, adjustment of mA and/or kV according to patient's size (including appropriate matching for site-specific examinations), or use of iterative reconstruction technique. FINDINGS: Lower chest: Unremarkable No intraperitoneal free air. No free fluid. No fluid collection. Liver: Unremarkable. Gallbladder/biliary: Unremarkable Spleen: Unremarkable Pancreas: Unremarkable. Left Kidney: Unremarkable. Right kidney: Unremarkable. Adrenals: Unremarkable. Bowels/mesentery: There is no bowel obstruction. There is slight haziness in the pelvis. There are no diverticula evident, no diverticulitis. No appreciable bowel wall thickening. Minimal fatty umbilical hernia. Pelvis: Urinary bladder: Unremarkable  Slightly prominent caliber of the uterus with some heterogeneity. Small amount of air within the lower uterine segment. No discrete finding of the ovaries. Vascular: Aorta unremarkable for age. IVC unremarkable. Lymph Nodes: No adenopathy. Bones: No acute findings. Unremarkable for age. IMPRESSION[de-identified] 1.  Trace haziness in the pelvis. Might be from GYN structures. No definite ovarian cyst. - Enlarged uterus with some heterogeneity which may indicate presence of fibroids. -Small amount of air within the lower uterine segment which may relate to physical examination. If there has been no recent instrumentation/examination, then infection becomes a differential possibility. 2. No bowel findings. Thank you for this referral. 
 
 
Medications ordered:  
Medications  
cefTRIAXone (ROCEPHIN) 1 g in sterile water (preservative free) 10 mL IV syringe (not administered) iopamidol (ISOVUE 300) 61 % contrast injection  mL (100 mL IntraVENous Given 1/8/19 1303) morphine injection 4 mg (4 mg IntraVENous Given 1/8/19 1503) Medical Decision Making Initial Medical Decision Making and DDx: 
Pain has been going on for a month most concerned for constipation will get CT abd to r/o diverticulitis or bowel obstruction, both unlikely. ED Course: Progress Notes, Reevaluation, and Consults: 
 2:55 PM Patient is now in ED room. She has suprapubic tenderness. CT raises question for pelvic infection. Denies sexual activity. Discussed pelvic exam and the patient is comfortable moving forward. CT and pelvic exam concerning for pelvic infection. Not clearly PID. Cultures and testing sent. Will treat empirically and refer to gyn. I am the first provider for this patient. I reviewed the vital signs, available nursing notes, past medical history, past surgical history, family history and social history. Vital Signs-Reviewed the patient's vital signs. Pulse Oximetry Analysis - 100% in room air, WNL Cardiac Monitor: 
Rate/Rhythm:  90 bpm 
 
EKG: Interpreted by the EP. Time Interpreted: 10:17 Rate: 85 bpm 
 Rhythm: SR Interpretation: Nothing acute Records Reviewed: Nursing Notes and Old Medical Records (Time of Review: 11:27 AM) Diagnosis Clinical Impression: 1. Pelvic pain Disposition: Discharge Follow-up Information Follow up With Specialties Details Why Contact Oscar Covarrubias MD Obstetrics & Gynecology, Obstetrics, Gynecology In 2 days  [address] [place] [id number] 
[phone] Medication List  
  
START taking these medications   
doxycycline 100 mg capsule Commonly known as:  Sunday Bougie Take 1 Cap by mouth two (2) times a day for 14 days. metroNIDAZOLE 500 mg tablet Commonly known as:  FLAGYL Take 1 Tab by mouth three (3) times daily for 10 days. ASK your doctor about these medications   
amLODIPine 10 mg tablet Commonly known as:  NORVASC 
  
multivitamin, tx-iron-ca-min 9 mg iron-400 mcg Tab tablet Commonly known as:  THERA-M w/ IRON 
  
PLAVIX 75 mg Tab Generic drug:  clopidogrel 
  
simvastatin 20 mg tablet Commonly known as:  ZOCOR Where to Get Your Medications Information about where to get these medications is not yet available Ask your nurse or doctor about these medications · doxycycline 100 mg capsule · metroNIDAZOLE 500 mg tablet 
  
 
_______________________________ Attestations: 
Scribe Attestation Rebeca Vega acting as a scribe for and in the presence of Shelley Alcantar MD     
January 08, 2019 at 11:27 AM 
    
Provider Attestation:     
I personally performed the services described in the documentation, reviewed the documentation, as recorded by the scribe in my presence, and it accurately and completely records my words and actions. January 08, 2019 at 11:27 AM - Katie Chin MD   
_______________________________

## 2019-01-08 NOTE — DISCHARGE INSTRUCTIONS
Patient Education        Pelvic Pain: Care Instructions  Your Care Instructions    Pelvic pain, or pain in the lower belly, can have many causes. Often pelvic pain is not serious and gets better in a few days. If your pain continues or gets worse, you may need tests and treatment. Tell your doctor about any new symptoms. These may be signs of a serious problem. Follow-up care is a key part of your treatment and safety. Be sure to make and go to all appointments, and call your doctor if you are having problems. It's also a good idea to know your test results and keep a list of the medicines you take. How can you care for yourself at home? · Rest until you feel better. Lie down, and raise your legs by placing a pillow under your knees. · Drink plenty of fluids. You may find that small, frequent sips are easier on your stomach than if you drink a lot at once. Avoid drinks with carbonation or caffeine, such as soda pop, tea, or coffee. · Try eating several small meals instead of 2 or 3 large ones. Eat mild foods, such as rice, dry toast or crackers, bananas, and applesauce. Avoid fatty and spicy foods, other fruits, and alcohol until 48 hours after your symptoms have gone away. · Take an over-the-counter pain medicine, such as acetaminophen (Tylenol), ibuprofen (Advil, Motrin), or naproxen (Aleve). Read and follow all instructions on the label. · Do not take two or more pain medicines at the same time unless the doctor told you to. Many pain medicines have acetaminophen, which is Tylenol. Too much acetaminophen (Tylenol) can be harmful. · You can put a heating pad, a warm cloth, or moist heat on your belly to relieve pain. When should you call for help? Call your doctor now or seek immediate medical care if:    · You have a new or higher fever.     · You have unusual vaginal bleeding.     · You have new or worse belly or pelvic pain.     · You have vaginal discharge that has increased in amount or smells bad.  Watch closely for changes in your health, and be sure to contact your doctor if:    · You do not get better as expected. Where can you learn more? Go to http://lucien-morgan.info/. Enter [phone] in the search box to learn more about \"Pelvic Pain: Care Instructions. \"  Current as of: May 15, 2018  Content Version: 11.8  © 4142-5827 [employer]. Care instructions adapted under license by [employer] (which disclaims liability or warranty for this information). If you have questions about a medical condition or this instruction, always ask your healthcare professional. [address] any warranty or liability for your use of this information.

## 2019-01-08 NOTE — LETTER
[address] [place] [address] [facility] EMERGENCY DEPT 
[address] [facility] [id number] [phone] Work/School Note Date: 1/8/2019 To Whom It May concern: 
 
Elvin Martinez was seen and treated today in the emergency room by the following provider(s): 
Attending Provider: Dipak Cowan MD.   
 
Elvin Martinez may return to work on Thursday 1/10/2019. Sincerely, Gil Varela RN

## 2019-01-08 NOTE — ED TRIAGE NOTES
\"for the past month the lower part of my stomach and upper part of my legs have been hurting really bad, last night I couldn't handle the pain any longer. \"

## 2019-01-08 NOTE — ED NOTES
Pt discharged per MD order. Pt verbalized understanding of discharge instructions and follow up care. Opportunity provided to ask questions. Prescription education given. Work note provided. Pt ambulated independently out of ED.

## 2019-01-09 LAB
C TRACH RRNA SPEC QL NAA+PROBE: NEGATIVE
N GONORRHOEA RRNA SPEC QL NAA+PROBE: NEGATIVE
SPECIMEN SOURCE: NORMAL

## 2019-01-13 LAB
BACTERIA SPEC CULT: ABNORMAL
GRAM STN SPEC: ABNORMAL
SERVICE CMNT-IMP: ABNORMAL

## 2019-02-20 ENCOUNTER — DOCUMENTATION ONLY (OUTPATIENT)
Dept: SURGERY | Age: 54
End: 2019-02-20

## 2019-02-20 NOTE — PROGRESS NOTES
Patient was seen in out office for a colon screening appointment on 5/1/2018 and given an original appointment for colonoscopy on 6/20/2018. Ronda King She has no insurance and was given a financial application for [employer]- there is no record that she has turned it in. I have rescheduled her several times over the past year  9/26/18; 12/12/201 and  2/27/2019. At this time the appointment for 2/27/2019 is being cancelled until the patient completes & turns in an application .

## 2019-11-18 ENCOUNTER — OFFICE VISIT (OUTPATIENT)
Dept: CARDIOLOGY CLINIC | Age: 54
End: 2019-11-18

## 2019-11-18 VITALS
BODY MASS INDEX: 23.48 KG/M2 | HEART RATE: 80 BPM | WEIGHT: 149.6 LBS | DIASTOLIC BLOOD PRESSURE: 92 MMHG | SYSTOLIC BLOOD PRESSURE: 136 MMHG | HEIGHT: 67 IN

## 2019-11-18 DIAGNOSIS — Z86.73 HISTORY OF STROKE: ICD-10-CM

## 2019-11-18 DIAGNOSIS — I10 ESSENTIAL HYPERTENSION: ICD-10-CM

## 2019-11-18 DIAGNOSIS — R01.1 MURMUR, CARDIAC: ICD-10-CM

## 2019-11-18 DIAGNOSIS — R94.31 ABNORMAL EKG: Primary | ICD-10-CM

## 2019-11-18 DIAGNOSIS — E78.00 HYPERCHOLESTEREMIA: ICD-10-CM

## 2019-11-18 DIAGNOSIS — Z01.810 PREOP CARDIOVASCULAR EXAM: ICD-10-CM

## 2019-11-18 NOTE — PATIENT INSTRUCTIONS
There are no discontinued medications. A Healthy Heart: Care Instructions  Your Care Instructions    Heart disease occurs when a substance called plaque builds up in the vessels that supply oxygen-rich blood to your heart. This can narrow the blood vessels and reduce blood flow. A heart attack happens when blood flow is completely blocked. A high-fat diet, smoking, and other factors increase the risk of heart disease. Your doctor has found that you have a chance of having heart disease. You can do lots of things to keep your heart healthy. It may not be easy, but you can change your diet, exercise more, and quit smoking. These steps really work to lower your chance of heart disease. Follow-up care is a key part of your treatment and safety. Be sure to make and go to all appointments, and call your doctor if you are having problems. It's also a good idea to know your test results and keep a list of the medicines you take. How can you care for yourself at home? Diet    · Use less salt when you cook and eat. This helps lower your blood pressure. Taste food before salting. Add only a little salt when you think you need it. With time, your taste buds will adjust to less salt.     · Eat fewer snack items, fast foods, canned soups, and other high-salt, high-fat, processed foods.     · Read food labels and try to avoid saturated and trans fats. They increase your risk of heart disease by raising cholesterol levels.     · Limit the amount of solid fat-butter, margarine, and shortening-you eat. Use olive, peanut, or canola oil when you cook. Bake, broil, and steam foods instead of frying them.     · Eating fish can lower your risk for heart disease. Eat at least 2 servings of fish a week. [place], mackerel, herring, sardines, and chunk light tuna are very good choices. These fish contain omega-3 fatty acids.     · Eat a variety of fruit and vegetables every day.  Dark green, deep orange, red, or yellow fruits and vegetables are especially good for you. Examples include spinach, carrots, peaches, and berries.     · Foods high in fiber can reduce your cholesterol and provide important vitamins and minerals. High-fiber foods include whole-grain cereals and breads, oatmeal, beans, brown rice, citrus fruits, and apples.     · Limit drinks and foods with added sugar. These include candy, desserts, and soda pop.    Lifestyle changes    · If your doctor recommends it, get more exercise. Walking is a good choice. Bit by bit, increase the amount you walk every day. Try for at least 30 minutes on most days of the week. You also may want to swim, bike, or do other activities.     · Do not smoke. If you need help quitting, talk to your doctor about stop-smoking programs and medicines. These can increase your chances of quitting for good. Quitting smoking may be the most important step you can take to protect your heart. It is never too late to quit. You will get health benefits right away.     · Limit alcohol to 2 drinks a day for men and 1 drink a day for women. Too much alcohol can cause health problems. Medicines    · Take your medicines exactly as prescribed. Call your doctor if you think you are having a problem with your medicine.     · If your doctor recommends aspirin, take the amount directed each day. Make sure you take aspirin and not another kind of pain reliever, such as acetaminophen (Tylenol). If you take ibuprofen (such as Advil or Motrin) for other problems, take aspirin at least 2 hours before taking ibuprofen. When should you call for help? Call 911 if you have symptoms of a heart attack. These may include:    · Chest pain or pressure, or a strange feeling in the chest.     · Sweating.     · Shortness of breath.     · Pain, pressure, or a strange feeling in the back, neck, jaw, or upper belly or in one or both shoulders or arms.     · Lightheadedness or sudden weakness.     · A fast or irregular heartbeat.  After you call 911, the  may tell you to chew 1 adult-strength or 2 to 4 low-dose aspirin. Wait for an ambulance. Do not try to drive yourself.   Watch closely for changes in your health, and be sure to contact your doctor if you have any problems. Where can you learn more? Go to http://lucien-morgan.info/. Enter T844 in the search box to learn more about \"A Healthy Heart: Care Instructions. \"  Current as of: April 9, 2019  Content Version: 12.2  © 0558-0144 [employer]. Care instructions adapted under license by [employer] (which disclaims liability or warranty for this information). If you have questions about a medical condition or this instruction, always ask your healthcare professional. [address] any warranty or liability for your use of this information.

## 2019-11-18 NOTE — PROGRESS NOTES
HISTORY OF PRESENT ILLNESS  Martina Jesus is a 62 y.o. female. 11/19 seen preoperatively for surgical clearance for urological procedure. Patient denies significant chest pain, SOB, palpitations, edema, dizziness  Has good functional capacity as she can go up 3 flights of steps without getting short of breath. She can walk on a plane ground as much distress as she wants. New Patient   The history is provided by the patient and medical records. Pertinent negatives include no chest pain, no headaches and no shortness of breath. Review of Systems   Constitutional: Negative for chills, fever, malaise/fatigue and weight loss. HENT: Negative for nosebleeds. Eyes: Negative for discharge. Respiratory: Negative for cough, shortness of breath and wheezing. Cardiovascular: Negative for chest pain, palpitations, orthopnea, claudication, leg swelling and PND. Gastrointestinal: Negative for diarrhea, nausea and vomiting. Genitourinary: Negative for dysuria and hematuria. Musculoskeletal: Negative for joint pain. Skin: Negative for rash. Neurological: Negative for dizziness, seizures, loss of consciousness and headaches. Endo/Heme/Allergies: Negative for polydipsia. Does not bruise/bleed easily. Psychiatric/Behavioral: Negative for depression and substance abuse. The patient does not have insomnia.       No Known Allergies    Past Medical History:   Diagnosis Date    Anemia     Cancer ([address]     Cervix    Constipation     Diabetes ([address]     11/19 diet controlled    Hyperlipidemia     Hypertension     Poor appetite     Stomach pain     Stroke ([address]     no effects       Family History   Problem Relation Age of Onset    Cancer Mother     Hypertension Mother     Diabetes Mother     Stroke Mother     Cancer Sister         cervical    Hypertension Sister     Cancer Brother         pancreatic    Hypertension Brother     Diabetes Brother     Cancer Sister         colon    Hypertension Sister     Heart Attack Neg Hx        Social History     Tobacco Use    Smoking status: Former Smoker     Types: Cigarettes     Last attempt to quit: 2019     Years since quittin.3    Smokeless tobacco: Never Used   Substance Use Topics    Alcohol use: Not Currently     Comment: occassionally    Drug use: No        Current Outpatient Medications   Medication Sig    oxycodone HCl/acetaminophen (PERCOCET PO) Take  by mouth as needed.  amLODIPine (NORVASC) 10 mg tablet Take 10 mg by mouth daily. Indications: hypertension    simvastatin (ZOCOR) 20 mg tablet Take 20 mg by mouth nightly.  multivitamin, tx-iron-ca-min (THERA-M W/ IRON) 9 mg iron-400 mcg tab tablet Take 1 Tab by mouth daily.  clopidogrel (PLAVIX) 75 mg tab Take 75 mg by mouth daily. No current facility-administered medications for this visit. Past Surgical History:   Procedure Laterality Date    HX APPENDECTOMY         Visit Vitals  BP (!) 136/92   Pulse 80   Ht 5' 7\" (1.702 m)   Wt 67.9 kg (149 lb 9.6 oz)   BMI 23.43 kg/m²       Diagnostic Studies:  I have reviewed the relevant tests done on the patient and show as follows  EKG tracings reviewed by me today. EKG Results     Procedure 720 Value Units Date/Time    AMB POC EKG ROUTINE W/ 12 LEADS, INTER & REP [[id number]] Resulted:  19 1106    Order Status:  Completed Updated:  19 1103        XR Results (most recent):  Results from [place] encounter on 19   XR CHEST PA LAT    Narrative Chest PA and lateral    INDICATION: Chest pain    COMPARISON: None    FINDINGS:    Two views of the chest were obtained. The lungs are clear. Cardiac silhouette is  normal. Pulmonary vasculature is unremarkable. Osseous structures are intact. Impression IMPRESSION:    No acute cardiopulmonary disease. No flowsheet data found. Ms. Akiko Arrieta has a reminder for a \"due or due soon\" health maintenance.  I have asked that she contact her primary care provider for follow-up on this health maintenance. Physical Exam   Constitutional: She is oriented to person, place, and time. She appears well-developed and well-nourished. No distress. HENT:   Head: Normocephalic and atraumatic. Mouth/Throat: Normal dentition. Eyes: Right eye exhibits no discharge. Left eye exhibits no discharge. No scleral icterus. Neck: Neck supple. No JVD present. Carotid bruit is not present. No thyromegaly present. Cardiovascular: Normal rate, regular rhythm, S1 normal, S2 normal and intact distal pulses. Exam reveals no gallop and no friction rub. Murmur heard. Harsh early systolic murmur is present with a grade of 3/6 at the upper right sternal border. Pulmonary/Chest: Effort normal and breath sounds normal. She has no wheezes. She has no rales. Abdominal: Soft. She exhibits no mass. There is no tenderness. Musculoskeletal: She exhibits no edema. Lymphadenopathy:        Right cervical: No superficial cervical adenopathy present. Left cervical: No superficial cervical adenopathy present. Neurological: She is alert and oriented to person, place, and time. Skin: Skin is warm and dry. No rash noted. Psychiatric: She has a normal mood and affect. Her behavior is normal.       ASSESSMENT and PLAN    Faxed EKGs were not of good quality so EKG was repeated today in the office. It is essentially within normal limits. Incomplete right bundle branch block seen at PCP is not seen today. Will clear for surgery but will get a routine echo postoperatively due to cardiac murmur. Cleared from cardiac view with the understanding that patient will have mild risk for this surgery. Diagnoses and all orders for this visit:    1. Abnormal EKG  -     AMB POC EKG ROUTINE W/ 12 LEADS, INTER & REP    2. Hypercholesteremia    3. Essential hypertension  -     AMB POC EKG ROUTINE W/ 12 LEADS, INTER & REP    4. History of stroke    5.  Preop cardiovascular exam    6. Murmur, cardiac  -     ECHO ADULT COMPLETE; Future        Pertinent laboratory and test data reviewed and discussed with patient. See patient instructions also for other medical advice given    There are no discontinued medications.     Follow-up and Dispositions    · Return in about 3 months (around 2/18/2020), or if symptoms worsen or fail to improve, for post test.

## 2021-01-01 ENCOUNTER — HOME CARE VISIT (OUTPATIENT)
Dept: HOSPICE | Facility: HOSPICE | Age: 56
End: 2021-01-01
Payer: MEDICARE

## 2021-01-01 ENCOUNTER — HOME CARE VISIT (OUTPATIENT)
Dept: SCHEDULING | Facility: HOME HEALTH | Age: 56
End: 2021-01-01
Payer: MEDICARE

## 2021-01-01 ENCOUNTER — HOSPICE ADMISSION (OUTPATIENT)
Dept: HOSPICE | Facility: HOSPICE | Age: 56
End: 2021-01-01
Payer: MEDICARE

## 2021-01-01 PROCEDURE — G0299 HHS/HOSPICE OF RN EA 15 MIN: HCPCS

## 2021-01-01 PROCEDURE — 0651 HSPC ROUTINE HOME CARE

## 2021-01-01 PROCEDURE — HOSPICE MEDICATION HC HH HOSPICE MEDICATION

## 2021-01-01 PROCEDURE — 3336500001 HSPC ELECTION

## 2021-01-01 PROCEDURE — G0155 HHCP-SVS OF CSW,EA 15 MIN: HCPCS

## 2021-01-01 PROCEDURE — 3331090004 HSPC SERVICE INTENSITY ADD-ON

## 2021-09-07 NOTE — HOSPICE
Entered pt home and observed pt resting in hospital bed w/ NAD Noted. No c/o @ this time and respirations even and unlabored. Care giver stated that pt had not had any pain reported since the 5th and previously had been giving pt tylenol but the pt's  stated that he did not want morphine to be given because he believed it made her too drowsy, not herself, and concerns over it hastening death.  of pt not in home at the time but educated care giver benefits of morphine and that it did not hasten death. If there was a concern for it making her too droswy then another medication could be used. Pt had been on Roxicodone 10mg tablets and asked care giver and pt if this would be ok while pt was taking oral medications. Stated yes. Order recieved from Rich Chapin MD and this was ordered through [facility] along w/ other comfort medications; Compazine, Haldol, Tylenol Supp; Roxicodone; Levsin, and Ducolax Supp. Discussed briefly opitions that could be used if pt ever was not taking anything orally and if pain worsens and roxicodone is not working anymore to call and let the office know so that pain medication can be altered. Educated on bowel regimen and medications that can be used for this; like the suppositories or that Senna-S could be ordered if there was ever a need. While doing assessment pt began to c/o of groin pain and showed this nurse that there was vaginal discharge as well. Excoriation noted and asked how often pt has this pain. States that it comes on off and on. Pain had started to resolve slightly towards the end of the visit and pt was resting in hospital bed w/ eyes closed respirations even and unlabored. Per Care giver while she was out picking up her daughter the  was at home in a back room he came out to living room in reponse to pt calling out and noted that she was on floor. Pt care giver states that it looked as if she had slid out of bed onto her bottom.  Floor carpeted underneath bed and no noted injury or trauma. No c/o of pain or discomfort for pt. No injury or trauma to head. Educated on fall percautions and that somebody should be w/ pt for any and all transfers. Supplies ordered for pt gloves, chux, briefs, zinc cream, bath spray, and basin.  had arrived back towards end of visit and asked if there were any questions or concerns he had for this nurse. He stated \"No\". Reenforced use of 24/7 office number. No further questions or concerns from care givers or pt. Exited home.

## 2021-09-07 NOTE — HOSPICE
Patient Name: Carmelita Guan  Date of Admission: 9/3/21   Service: Bryn Bradford (if known):  NO  : [birth year]  Allergies: NONE  Caregiver name/Next of Kin: JL Dennis  Verbal CTI received by Hospice Medical Director and from whom:[facility]/SHELLEY  Date of verbal CTI received by Hospice Medical Director:9/3/21    Code Status: DNR PENDING MD SIGNATURE  Hospice Diagnosis: METASTIC CERVICAL CANCER  Medical Conditions related to Hospice diagnosis and contributing to terminal prognosis:   Onset Date of Hospice DX: 2019  Location: [address] [place] [address]  Phone number: [phone]  P[employer]al: Patricia Habebaing of Rights reviewed and discussed with patient/caregiver: [address] reviewed with patient/caregiver & whom: YES/PT AND CAREGIVER  Verbalized understanding of Hospice Handbook page 5, after hours and page 6, Hospice does not pay for 911 emergency medical services, call hospice first, whom: yes/ PT AND CAREGIVER    Post mortem/ arrangements identified by family:  UNDECIDED  Bereavement Risk level on admission: MODERATE    Vitals: /76 HR, RR, temp, pulse ox98, oxygen  no    CLINICAL STATUS- MAC-24   Ht:5'6  Wt: 154  (Nurse assessment & current functional status)  In  pt was diagnosed with cervical cancer and received radiation and chemo. She was told she was not a surgical candidate and so they would just try to shrink the cancer. She remained basically symptom free until 2021 when she started having vaginal bleeding and was told the cancer was back. She received several sessions of radiation and because of the bleeding had to receive multiple blood transfusions for anemia. In march she was asked about hospice but denied. Since that time her pain(pelvis) has gotten worse and she finished all the pain meds from her March treatments. Currently she rates her pain at a 15/10. At my visit she was awake and oriented x4. GONZALEZ. Lungs clear w/o SOB. Bowel sounds present.  Reports not having an appetite so just snacks throughout the day. States she drinks water \"but probably not enough\". Her main complaint is pain. Rating it \"15/10\". She reports pelvic pain. Her labia and anam region are swollen and extremely excoriated from the radiation. She is unable to were underpants. She has several creams but none seem to help much. Because of the pain she needs assistance with walking and some ADL's. ER visits/Hospitalizations in past year (provide dates):ER VISITS 8/24/21, 8/1421,4/29/21 ALL FOR ANEMIA (rec'd blood transfusions in er and sent home)    DISEASE SPECIFIC History -PMH,cervical ca       LAB VALUES (when available):ON 8/24/21 H/H 7.8/23 POST TRANSFUSION     PPS: 40%    Care Coordination  \" Pt education provided this visit to include morphine and lorazepam, on hospice philosophy, services available, IDG, FOC, eligibility criteria, fall risk precautions; answered questions on symptom management and disease process. Pt/caregiver degree of understanding: good. \" The following education was provided regarding medications, medication interactions, and look a-like medications uses, administration, possible side effects. Medications are effective at this time. Medications reviewed and discussed with Dr. Jaden Holder. Pt/CG notified of any discrepancies/medication interactions. \" Is patient prescribed controlled substances? Yes. Discussion occurred with pt/caregiver regarding the safe management and disposal of controlled substances. Controlled substance education reviewed in admission packet/book: yes. Pt/caregiver verbalized understanding of importance of the use of controlled substances and protocol of disposal of controlled substances. \" If patient is prescribed a controlled substance, was education provided to patient/caregiver on bowel regimen:  Yes. If patient/caregiver refuses bowel regimen if on controlled substance, document reason (n/a).     \" Family agrees to POC/Medication regimen. Yes     \" Personal care supplies orders - chux, barrier cream, mouth swabs, wash basin: yes     \" DME: Company and order # [employer] [place]     \" Medications ordered: From where:MORPHINE AND ATIVAN FROM LOCAL PHARMACY, ALL OTHER MEDS FROM [facility]    \"   Hospice Team Orders (Information sent in admission email)  1. SN - 2x/wk and prn-medication and symptom management  2. MSW - Request (MSW places orders)  3.  - Request  ( places orders)   4. Hospice Aide - yes   5.  Volunteer - yes     Clinician/Title:    RUSS                                             Date: 9/3/21

## 2021-09-09 NOTE — HOSPICE
Recieved call from office stating that pt daughter called and asked if nurse could call back and come see pt. Call made out to daughter and she stated Roxicodone was not working for pt's pain and that pt was in a lot of pain throughout the night and all today. Per previous note expresses how morphine was not being given per 's request because  believed that the morphine was \"killing\" the patient. Call made out to  to talk about use of morphine. During phone call asked what concerns he had about the morphine so that these could be clarified. Stated that it would \"bring down the heart rate to kill her\" and that he was told this by a friend and a nurse that he knows. Educated that morphine can lower decrease respirations and heart rate but the range that we allow families to use for pt's would not \"kill\" pt. Explained that pt was on hospice because her primary dx and disease process is what is \"killing\" the patient and the morphine is to make her comfortable throughout this transition. Morphine was working for the pt initally when she first came on hospice but  requested it be stopped and now pt's pain is back. Tried to express that if it made the  more comfortable to space out doses and that we want them to start low and increase from there if needed.  continued to say that he \"knew what that morphine is doing\". Suddenly  stated \"Fine you know what! I'm done arguing. You guys are going to do what you want to do. My hands are clean of this. Give her the morphine. \" Tried to encourage talking about this more so that a compromise could be made to make sure he was comfortable with what hospice was doing AND that the patient was comfortable.  did not want to talk about it anymore.  stated then that he \"threw away all that stuff\" in reference to the morphine. Clarified that there was no morphine in the house for the patient.  Discussed w/ director and MD about this and new morphine dose ordered and sent to [facility] pharmacy on request of daughter. Daughter who is main caregiver for pt and gives patient all of her medications is comfortable with medicating patient w/ morphine and now that  has said that we can she is willing to continue to medicate as needed patient's pain w/ morphine. During call daughter also said that pt was having periods of confusion and requested that this nurse come to observe pt. On entering home observed pt resting in bed w/ increased respiratory rate. Pt w/ eyes closed and responsive to pain and physical touch. Pt in and out a lot during visit but when she would open her eyes and be alert she would wince and grimace and say how much pain she was in. Pt w/ noted secretions building up and educated on use of levsin for this and repositioning once pain was under control as well to help secretions drain. Educated on use of morphine for SOB and pain if needed. Pt at times eyes would be \"glassy\" and become fixed before she would close them again. Pt would only stay alert for a minute or two at the time. Spoke w/ family about what to expect if pt is transitioning and how to medicate to help relieve symptoms. Asked if pt family would like  services and stated \"yes\" call made to office to make  aware.  to see pt family tomororw. Pt appreciative and encouraged use of office number for any changes or questions. Exited home.

## 2021-09-09 NOTE — HOSPICE
performed initial assessment and provided pastoral care.  provided listening presence and discussed patient spiritual care needs. Caregiver shared/discussed family dynamics and story.   Patient and caregiver accepted visits 1x monthly with PRN visits schedules as needed  The following standard precautions for infection control were utilized:  Mask

## 2021-09-10 NOTE — HOSPICE
I arrived wearing PPE of mask and goggles. Patient received ***    Care provided per established plan of care    Patient with or without complaints during care provided.     Patient verbalized ***    Family/caregiver requests ***    Communicated to , Clinical Coordinator, or Director regarding patient/family/caregiver/aide findings     Status of patient upon end of hospice aid visit ***

## 2021-09-11 NOTE — HOSPICE
Entered pt home and observed pt resting on bed w/ NAD noted. When I asked if pt was feeling ok she nodded her head. Pt daughter has been medicating pt w/ morphine and ativan throuhgout the day. States that she has not had to give her as much as the past couple of days and pt has been staying comfortable. Attempted to get BP and place pulse ox on finger and pt waved her hands and weakly pushed this nurse away. No VS were taken at this time. Pt responsive to verbal stimuli. Respirations even and unlabored. Pt will speak 1-2 words a day per daughter. Daughter also states that pt has not been eating or drinking over the last few days. Renforced keeping the patient comfortable and that if she does not want to eat or drink she does not need to. Discussed w/ daughter about having an aide come. An aide had been scheduled before when care giver stated one could come twice a week. Both times this week the aide visits were cancelled because the daughter had already bathed the pt. Daughter states that she does not need aide services at this time like she thought she would. Aide visits cancelled at this time. Pt does not need any medications at this time. Supplies had come in for pt but daughter states swabs were not with everything else. Looked back on order and saw swabs had been ordered. Gave car stock swabs to pt daughter at this time. Pt has been having scant BM and urination has decreased. Pt daughter at this time w/ no other questions or concerns. Pt appears comfortable resting in bed. Encouraged use of office number if needed. Exited home.